# Patient Record
Sex: MALE | Race: OTHER | HISPANIC OR LATINO
[De-identification: names, ages, dates, MRNs, and addresses within clinical notes are randomized per-mention and may not be internally consistent; named-entity substitution may affect disease eponyms.]

---

## 2017-11-02 ENCOUNTER — APPOINTMENT (OUTPATIENT)
Dept: PULMONOLOGY | Facility: CLINIC | Age: 42
End: 2017-11-02
Payer: MEDICARE

## 2017-11-02 VITALS
WEIGHT: 298 LBS | BODY MASS INDEX: 40.36 KG/M2 | SYSTOLIC BLOOD PRESSURE: 115 MMHG | RESPIRATION RATE: 14 BRPM | OXYGEN SATURATION: 98 % | HEIGHT: 72 IN | HEART RATE: 79 BPM | DIASTOLIC BLOOD PRESSURE: 65 MMHG

## 2017-11-02 DIAGNOSIS — Z86.79 PERSONAL HISTORY OF OTHER DISEASES OF THE CIRCULATORY SYSTEM: ICD-10-CM

## 2017-11-02 DIAGNOSIS — Z78.9 OTHER SPECIFIED HEALTH STATUS: ICD-10-CM

## 2017-11-02 DIAGNOSIS — Z82.5 FAMILY HISTORY OF ASTHMA AND OTHER CHRONIC LOWER RESPIRATORY DISEASES: ICD-10-CM

## 2017-11-02 DIAGNOSIS — F17.200 NICOTINE DEPENDENCE, UNSPECIFIED, UNCOMPLICATED: ICD-10-CM

## 2017-11-02 DIAGNOSIS — R73.03 PREDIABETES.: ICD-10-CM

## 2017-11-02 DIAGNOSIS — Z86.19 PERSONAL HISTORY OF OTHER INFECTIOUS AND PARASITIC DISEASES: ICD-10-CM

## 2017-11-02 DIAGNOSIS — Z86.69 PERSONAL HISTORY OF OTHER DISEASES OF THE NERVOUS SYSTEM AND SENSE ORGANS: ICD-10-CM

## 2017-11-02 PROCEDURE — 99205 OFFICE O/P NEW HI 60 MIN: CPT | Mod: 25

## 2017-11-02 PROCEDURE — 94729 DIFFUSING CAPACITY: CPT

## 2017-11-02 PROCEDURE — 94620 PULMONARY STRESS TESTING SIMPLE: CPT

## 2017-11-02 PROCEDURE — 94010 BREATHING CAPACITY TEST: CPT | Mod: 59

## 2017-11-02 PROCEDURE — 71020: CPT

## 2017-11-02 RX ORDER — FUROSEMIDE 80 MG/1
TABLET ORAL
Refills: 0 | Status: ACTIVE | COMMUNITY

## 2017-11-02 RX ORDER — LOSARTAN POTASSIUM AND HYDROCHLOROTHIAZIDE 12.5; 1 MG/1; MG/1
100-12.5 TABLET ORAL
Refills: 0 | Status: ACTIVE | COMMUNITY

## 2017-11-02 RX ORDER — GABAPENTIN 300 MG
300 TABLET ORAL
Refills: 0 | Status: ACTIVE | COMMUNITY

## 2017-11-02 RX ORDER — CANAGLIFLOZIN 300 MG/1
TABLET, FILM COATED ORAL
Refills: 0 | Status: ACTIVE | COMMUNITY

## 2017-11-02 RX ORDER — DILTIAZEM HYDROCHLORIDE 120 MG/1
120 TABLET ORAL
Refills: 0 | Status: ACTIVE | COMMUNITY

## 2017-11-02 RX ORDER — FLUTICASONE PROPIONATE 50 MCG
SPRAY, SUSPENSION NASAL
Refills: 0 | Status: ACTIVE | COMMUNITY

## 2017-11-02 RX ORDER — MONTELUKAST SODIUM 10 MG/1
10 TABLET, FILM COATED ORAL
Refills: 0 | Status: ACTIVE | COMMUNITY

## 2017-11-02 RX ORDER — METFORMIN HYDROCHLORIDE 1000 MG/1
1000 TABLET, COATED ORAL
Refills: 0 | Status: ACTIVE | COMMUNITY

## 2017-11-02 RX ORDER — SITAGLIPTIN 100 MG/1
TABLET, FILM COATED ORAL
Refills: 0 | Status: ACTIVE | COMMUNITY

## 2017-11-02 RX ORDER — AZELASTINE HYDROCHLORIDE 137 UG/1
137 SPRAY, METERED NASAL
Refills: 0 | Status: ACTIVE | COMMUNITY

## 2017-12-14 ENCOUNTER — APPOINTMENT (OUTPATIENT)
Dept: PULMONOLOGY | Facility: CLINIC | Age: 42
End: 2017-12-14
Payer: MEDICARE

## 2017-12-14 VITALS
OXYGEN SATURATION: 97 % | BODY MASS INDEX: 41.99 KG/M2 | RESPIRATION RATE: 17 BRPM | DIASTOLIC BLOOD PRESSURE: 80 MMHG | WEIGHT: 310 LBS | SYSTOLIC BLOOD PRESSURE: 120 MMHG | HEIGHT: 72 IN | HEART RATE: 70 BPM

## 2017-12-14 PROCEDURE — 99214 OFFICE O/P EST MOD 30 MIN: CPT | Mod: 25

## 2017-12-14 PROCEDURE — 94010 BREATHING CAPACITY TEST: CPT

## 2017-12-14 RX ORDER — MONTELUKAST 10 MG/1
10 TABLET, FILM COATED ORAL
Qty: 1 | Refills: 3 | Status: ACTIVE | COMMUNITY
Start: 2017-12-14 | End: 1900-01-01

## 2018-02-23 ENCOUNTER — MEDICATION RENEWAL (OUTPATIENT)
Age: 43
End: 2018-02-23

## 2018-03-14 ENCOUNTER — APPOINTMENT (OUTPATIENT)
Dept: PULMONOLOGY | Facility: CLINIC | Age: 43
End: 2018-03-14

## 2018-04-09 ENCOUNTER — APPOINTMENT (OUTPATIENT)
Dept: PULMONOLOGY | Facility: CLINIC | Age: 43
End: 2018-04-09
Payer: MEDICARE

## 2018-04-09 VITALS
OXYGEN SATURATION: 97 % | RESPIRATION RATE: 17 BRPM | BODY MASS INDEX: 41.31 KG/M2 | DIASTOLIC BLOOD PRESSURE: 80 MMHG | WEIGHT: 305 LBS | HEIGHT: 72 IN | HEART RATE: 68 BPM | SYSTOLIC BLOOD PRESSURE: 140 MMHG

## 2018-04-09 PROCEDURE — 94010 BREATHING CAPACITY TEST: CPT

## 2018-04-09 PROCEDURE — 94729 DIFFUSING CAPACITY: CPT

## 2018-04-09 PROCEDURE — 99214 OFFICE O/P EST MOD 30 MIN: CPT | Mod: 25

## 2018-08-09 ENCOUNTER — NON-APPOINTMENT (OUTPATIENT)
Age: 43
End: 2018-08-09

## 2018-08-09 ENCOUNTER — APPOINTMENT (OUTPATIENT)
Dept: PULMONOLOGY | Facility: CLINIC | Age: 43
End: 2018-08-09
Payer: MEDICARE

## 2018-08-09 VITALS
RESPIRATION RATE: 18 BRPM | SYSTOLIC BLOOD PRESSURE: 120 MMHG | WEIGHT: 299 LBS | DIASTOLIC BLOOD PRESSURE: 76 MMHG | HEIGHT: 72 IN | BODY MASS INDEX: 40.5 KG/M2 | OXYGEN SATURATION: 97 % | HEART RATE: 66 BPM

## 2018-08-09 PROCEDURE — 99214 OFFICE O/P EST MOD 30 MIN: CPT | Mod: 25

## 2018-08-09 PROCEDURE — 94010 BREATHING CAPACITY TEST: CPT

## 2018-08-09 PROCEDURE — 99406 BEHAV CHNG SMOKING 3-10 MIN: CPT

## 2018-08-15 ENCOUNTER — MEDICATION RENEWAL (OUTPATIENT)
Age: 43
End: 2018-08-15

## 2018-09-25 ENCOUNTER — EMERGENCY (EMERGENCY)
Facility: HOSPITAL | Age: 43
LOS: 1 days | Discharge: HOME | End: 2018-09-25
Attending: EMERGENCY MEDICINE | Admitting: EMERGENCY MEDICINE
Payer: MEDICARE

## 2018-09-25 VITALS
SYSTOLIC BLOOD PRESSURE: 173 MMHG | HEART RATE: 73 BPM | RESPIRATION RATE: 18 BRPM | OXYGEN SATURATION: 97 % | DIASTOLIC BLOOD PRESSURE: 73 MMHG

## 2018-09-25 VITALS
DIASTOLIC BLOOD PRESSURE: 77 MMHG | RESPIRATION RATE: 18 BRPM | HEART RATE: 89 BPM | SYSTOLIC BLOOD PRESSURE: 147 MMHG | OXYGEN SATURATION: 97 % | TEMPERATURE: 98 F

## 2018-09-25 DIAGNOSIS — D17.20 BENIGN LIPOMATOUS NEOPLASM OF SKIN AND SUBCUTANEOUS TISSUE OF UNSPECIFIED LIMB: Chronic | ICD-10-CM

## 2018-09-25 DIAGNOSIS — K42.0 UMBILICAL HERNIA WITH OBSTRUCTION, WITHOUT GANGRENE: Chronic | ICD-10-CM

## 2018-09-25 LAB
ALBUMIN SERPL ELPH-MCNC: 4.3 G/DL — SIGNIFICANT CHANGE UP (ref 3.3–5)
ALP SERPL-CCNC: 82 U/L — SIGNIFICANT CHANGE UP (ref 40–120)
ALT FLD-CCNC: 17 U/L — SIGNIFICANT CHANGE UP (ref 4–41)
ANISOCYTOSIS BLD QL: SLIGHT — SIGNIFICANT CHANGE UP
APPEARANCE UR: SIGNIFICANT CHANGE UP
APTT BLD: 29.7 SEC — SIGNIFICANT CHANGE UP (ref 27.5–37.4)
AST SERPL-CCNC: 14 U/L — SIGNIFICANT CHANGE UP (ref 4–40)
BACTERIA # UR AUTO: HIGH
BASE EXCESS BLDV CALC-SCNC: 2.9 MMOL/L — SIGNIFICANT CHANGE UP
BASOPHILS # BLD AUTO: 0.07 K/UL — SIGNIFICANT CHANGE UP (ref 0–0.2)
BASOPHILS NFR BLD AUTO: 0.4 % — SIGNIFICANT CHANGE UP (ref 0–2)
BASOPHILS NFR SPEC: 0 % — SIGNIFICANT CHANGE UP (ref 0–2)
BILIRUB SERPL-MCNC: 0.6 MG/DL — SIGNIFICANT CHANGE UP (ref 0.2–1.2)
BILIRUB UR-MCNC: NEGATIVE — SIGNIFICANT CHANGE UP
BLASTS # FLD: 0 % — SIGNIFICANT CHANGE UP (ref 0–0)
BLD GP AB SCN SERPL QL: NEGATIVE — SIGNIFICANT CHANGE UP
BLOOD GAS VENOUS - CREATININE: 0.96 MG/DL — SIGNIFICANT CHANGE UP (ref 0.5–1.3)
BLOOD UR QL VISUAL: HIGH
BUN SERPL-MCNC: 21 MG/DL — SIGNIFICANT CHANGE UP (ref 7–23)
C TRACH RRNA SPEC QL NAA+PROBE: SIGNIFICANT CHANGE UP
CALCIUM SERPL-MCNC: 9.4 MG/DL — SIGNIFICANT CHANGE UP (ref 8.4–10.5)
CHLORIDE BLDV-SCNC: 106 MMOL/L — SIGNIFICANT CHANGE UP (ref 96–108)
CHLORIDE SERPL-SCNC: 100 MMOL/L — SIGNIFICANT CHANGE UP (ref 98–107)
CO2 SERPL-SCNC: 25 MMOL/L — SIGNIFICANT CHANGE UP (ref 22–31)
COLOR SPEC: YELLOW — SIGNIFICANT CHANGE UP
CREAT SERPL-MCNC: 1.07 MG/DL — SIGNIFICANT CHANGE UP (ref 0.5–1.3)
EOSINOPHIL # BLD AUTO: 0.1 K/UL — SIGNIFICANT CHANGE UP (ref 0–0.5)
EOSINOPHIL NFR BLD AUTO: 0.6 % — SIGNIFICANT CHANGE UP (ref 0–6)
EOSINOPHIL NFR FLD: 0.9 % — SIGNIFICANT CHANGE UP (ref 0–6)
GAS PNL BLDV: 137 MMOL/L — SIGNIFICANT CHANGE UP (ref 136–146)
GLUCOSE BLDV-MCNC: 96 — SIGNIFICANT CHANGE UP (ref 70–99)
GLUCOSE SERPL-MCNC: 95 MG/DL — SIGNIFICANT CHANGE UP (ref 70–99)
GLUCOSE UR-MCNC: >1000 — HIGH
HBA1C BLD-MCNC: 5.2 % — SIGNIFICANT CHANGE UP (ref 4–5.6)
HCO3 BLDV-SCNC: 26 MMOL/L — SIGNIFICANT CHANGE UP (ref 20–27)
HCT VFR BLD CALC: 48 % — SIGNIFICANT CHANGE UP (ref 39–50)
HCT VFR BLDV CALC: 51.5 % — HIGH (ref 39–51)
HGB BLD-MCNC: 16.3 G/DL — SIGNIFICANT CHANGE UP (ref 13–17)
HGB BLDV-MCNC: 16.8 G/DL — SIGNIFICANT CHANGE UP (ref 13–17)
HYALINE CASTS # UR AUTO: NEGATIVE — SIGNIFICANT CHANGE UP
IMM GRANULOCYTES # BLD AUTO: 0.15 # — SIGNIFICANT CHANGE UP
IMM GRANULOCYTES NFR BLD AUTO: 0.9 % — SIGNIFICANT CHANGE UP (ref 0–1.5)
INR BLD: 1.02 — SIGNIFICANT CHANGE UP (ref 0.88–1.17)
KETONES UR-MCNC: NEGATIVE — SIGNIFICANT CHANGE UP
LACTATE BLDV-MCNC: 1.3 MMOL/L — SIGNIFICANT CHANGE UP (ref 0.5–2)
LEUKOCYTE ESTERASE UR-ACNC: SIGNIFICANT CHANGE UP
LYMPHOCYTES # BLD AUTO: 16.5 % — SIGNIFICANT CHANGE UP (ref 13–44)
LYMPHOCYTES # BLD AUTO: 2.78 K/UL — SIGNIFICANT CHANGE UP (ref 1–3.3)
LYMPHOCYTES NFR SPEC AUTO: 11.6 % — LOW (ref 13–44)
MCHC RBC-ENTMCNC: 31.8 PG — SIGNIFICANT CHANGE UP (ref 27–34)
MCHC RBC-ENTMCNC: 34 % — SIGNIFICANT CHANGE UP (ref 32–36)
MCV RBC AUTO: 93.6 FL — SIGNIFICANT CHANGE UP (ref 80–100)
METAMYELOCYTES # FLD: 0 % — SIGNIFICANT CHANGE UP (ref 0–1)
MICROCYTES BLD QL: SLIGHT — SIGNIFICANT CHANGE UP
MONOCYTES # BLD AUTO: 1.85 K/UL — HIGH (ref 0–0.9)
MONOCYTES NFR BLD AUTO: 11 % — SIGNIFICANT CHANGE UP (ref 2–14)
MONOCYTES NFR BLD: 13.4 % — HIGH (ref 2–9)
MYELOCYTES NFR BLD: 0 % — SIGNIFICANT CHANGE UP (ref 0–0)
N GONORRHOEA RRNA SPEC QL NAA+PROBE: SIGNIFICANT CHANGE UP
NEUTROPHIL AB SER-ACNC: 72.3 % — SIGNIFICANT CHANGE UP (ref 43–77)
NEUTROPHILS # BLD AUTO: 11.85 K/UL — HIGH (ref 1.8–7.4)
NEUTROPHILS NFR BLD AUTO: 70.6 % — SIGNIFICANT CHANGE UP (ref 43–77)
NEUTS BAND # BLD: 0 % — SIGNIFICANT CHANGE UP (ref 0–6)
NITRITE UR-MCNC: POSITIVE — HIGH
NRBC # FLD: 0 — SIGNIFICANT CHANGE UP
OTHER - HEMATOLOGY %: 0 — SIGNIFICANT CHANGE UP
PCO2 BLDV: 47 MMHG — SIGNIFICANT CHANGE UP (ref 41–51)
PH BLDV: 7.39 PH — SIGNIFICANT CHANGE UP (ref 7.32–7.43)
PH UR: 6 — SIGNIFICANT CHANGE UP (ref 5–8)
PLATELET # BLD AUTO: 201 K/UL — SIGNIFICANT CHANGE UP (ref 150–400)
PLATELET COUNT - ESTIMATE: NORMAL — SIGNIFICANT CHANGE UP
PMV BLD: 10 FL — SIGNIFICANT CHANGE UP (ref 7–13)
PO2 BLDV: 46 MMHG — HIGH (ref 35–40)
POTASSIUM BLDV-SCNC: 3.7 MMOL/L — SIGNIFICANT CHANGE UP (ref 3.4–4.5)
POTASSIUM SERPL-MCNC: 3.9 MMOL/L — SIGNIFICANT CHANGE UP (ref 3.5–5.3)
POTASSIUM SERPL-SCNC: 3.9 MMOL/L — SIGNIFICANT CHANGE UP (ref 3.5–5.3)
PROMYELOCYTES # FLD: 0 % — SIGNIFICANT CHANGE UP (ref 0–0)
PROT SERPL-MCNC: 7.5 G/DL — SIGNIFICANT CHANGE UP (ref 6–8.3)
PROT UR-MCNC: 100 — HIGH
PROTHROM AB SERPL-ACNC: 11.7 SEC — SIGNIFICANT CHANGE UP (ref 9.8–13.1)
RBC # BLD: 5.13 M/UL — SIGNIFICANT CHANGE UP (ref 4.2–5.8)
RBC # FLD: 13.2 % — SIGNIFICANT CHANGE UP (ref 10.3–14.5)
RBC CASTS # UR COMP ASSIST: >50 — HIGH (ref 0–?)
REVIEW TO FOLLOW: YES — SIGNIFICANT CHANGE UP
RH IG SCN BLD-IMP: POSITIVE — SIGNIFICANT CHANGE UP
SAO2 % BLDV: 78.4 % — SIGNIFICANT CHANGE UP (ref 60–85)
SODIUM SERPL-SCNC: 139 MMOL/L — SIGNIFICANT CHANGE UP (ref 135–145)
SP GR SPEC: 1.02 — SIGNIFICANT CHANGE UP (ref 1–1.04)
SPECIMEN SOURCE: SIGNIFICANT CHANGE UP
SQUAMOUS # UR AUTO: SIGNIFICANT CHANGE UP
UROBILINOGEN FLD QL: NORMAL — SIGNIFICANT CHANGE UP
VARIANT LYMPHS # BLD: 1.8 % — SIGNIFICANT CHANGE UP
WBC # BLD: 16.8 K/UL — HIGH (ref 3.8–10.5)
WBC # FLD AUTO: 16.8 K/UL — HIGH (ref 3.8–10.5)
WBC UR QL: >50 — HIGH (ref 0–?)

## 2018-09-25 PROCEDURE — 99285 EMERGENCY DEPT VISIT HI MDM: CPT | Mod: GC

## 2018-09-25 PROCEDURE — 74177 CT ABD & PELVIS W/CONTRAST: CPT | Mod: 26

## 2018-09-25 RX ORDER — SODIUM CHLORIDE 9 MG/ML
1000 INJECTION INTRAMUSCULAR; INTRAVENOUS; SUBCUTANEOUS ONCE
Qty: 0 | Refills: 0 | Status: COMPLETED | OUTPATIENT
Start: 2018-09-25 | End: 2018-09-25

## 2018-09-25 RX ORDER — CEPHALEXIN 500 MG
1 CAPSULE ORAL
Qty: 20 | Refills: 0
Start: 2018-09-25 | End: 2018-10-04

## 2018-09-25 RX ORDER — PIPERACILLIN AND TAZOBACTAM 4; .5 G/20ML; G/20ML
3.38 INJECTION, POWDER, LYOPHILIZED, FOR SOLUTION INTRAVENOUS ONCE
Qty: 0 | Refills: 0 | Status: COMPLETED | OUTPATIENT
Start: 2018-09-25 | End: 2018-09-25

## 2018-09-25 RX ORDER — MORPHINE SULFATE 50 MG/1
4 CAPSULE, EXTENDED RELEASE ORAL ONCE
Qty: 0 | Refills: 0 | Status: DISCONTINUED | OUTPATIENT
Start: 2018-09-25 | End: 2018-09-25

## 2018-09-25 RX ADMIN — PIPERACILLIN AND TAZOBACTAM 3.38 GRAM(S): 4; .5 INJECTION, POWDER, LYOPHILIZED, FOR SOLUTION INTRAVENOUS at 12:31

## 2018-09-25 RX ADMIN — MORPHINE SULFATE 4 MILLIGRAM(S): 50 CAPSULE, EXTENDED RELEASE ORAL at 12:31

## 2018-09-25 RX ADMIN — PIPERACILLIN AND TAZOBACTAM 200 GRAM(S): 4; .5 INJECTION, POWDER, LYOPHILIZED, FOR SOLUTION INTRAVENOUS at 11:38

## 2018-09-25 RX ADMIN — SODIUM CHLORIDE 1000 MILLILITER(S): 9 INJECTION INTRAMUSCULAR; INTRAVENOUS; SUBCUTANEOUS at 11:31

## 2018-09-25 RX ADMIN — MORPHINE SULFATE 4 MILLIGRAM(S): 50 CAPSULE, EXTENDED RELEASE ORAL at 11:32

## 2018-09-25 NOTE — ED PROVIDER NOTE - NS ED ROS FT
GENERAL: +subjective fever and chills  EYES: no change in vision  HEENT: no trouble swallowing or speaking  CARDIAC: no chest pain  PULMONARY: no cough or SOB  GI: +RLQ abdominal pain. no nausea, no vomiting, no diarrhea or constipation  : +dysuria. no increased frequency or discharge.   SKIN: no rashes  NEURO: no headache or neuro sxs   MSK: No joint pain     ~Pranay Bentley PGY1

## 2018-09-25 NOTE — ED PROVIDER NOTE - ATTENDING CONTRIBUTION TO CARE
LIANA COOPER  ATTENDING, MD: 44 yo M with a past medical history of Hypertension and DM presents with RLQ abdominal pain x 4 days and dysuria and subjective fevers and chills. Fever has been controlled with IB 400mg. Patient denies HA, NP, chest pain, SOB, cough, N/V/D/C, weakness, dizziness, urinary frequency or hematuria, extremity pain or swelling or other complaints.     PHYSICAL EXAM:    GENERAL: Patient is awake and alert and in no acute distress.  Non-toxic appearing.  A+Ox4  HEAD:  Airway patent.  No oropharyngeal edema.  No stridor.  Auricles are normal.    EYES: EOM grossly intact, conjunctiva non-injected and sclera clear  NECK: Supple, No vertebral point tenderness to palpation.  CHEST/LUNG: Lungs clear to auscultation bilaterally; no wheeze, no rhonchi,  no rales.    HEART: Regular rate and rhythm;   ABDOMEN: Soft, RLQ tenderness to palpation.  No rebound/no guarding.  Bowel sounds present x 4.   MSK/EXTREMITIES: No clubbing or cyanosis. Back is nontender, with no vertebral point tenderness to palpation, no CVAT.  Moving all 4 extremities.     NEURO: Neurologically grossly intact.   No obvious deficits.   PSYCH: Psychiatrically normal mood and affect.  No apparent risk to self or others.       DR. COOPER, ATTENDING MD:    I performed a face to face bedside interview with patient regarding history of present illness, review of symptoms and past medical history. I completed an independent physical exam.  I have discussed patient's plan of care with the team of health care providers.   I agree with note as stated above, having amended the EMR as needed to reflect my findings. I have discussed the assessment and plan of care.  This includes during the time I functioned as the attending physician for this patient.

## 2018-09-25 NOTE — ED PROVIDER NOTE - PHYSICAL EXAMINATION
Gen: AAOx3, non-toxic  Head: NCAT  HEENT: EOMI, oral mucosa moist, normal conjunctiva  Lung: CTAB, no respiratory distress, no wheezes/rhonchi/rales B/L, speaking in full sentences  CV: RRR, no murmurs, rubs or gallops  Abd: soft, TTP over RLQ, no rebound tenderness, no guarding, no CVA tenderness. no abdominal or inguinal hernias palpated  : normal penile exam. no erythema, rash, or discharge  MSK: no visible deformities  Neuro: No focal sensory or motor deficits, normal CN exam   Skin: Warm, well perfused, no rash  Psych: normal affect.     ~Pranay Bentley PGY1

## 2018-09-25 NOTE — ED PROVIDER NOTE - PMH
DM (diabetes mellitus)  Type II  GERD (gastroesophageal reflux disease)    HTN (hypertension)    Morbid obesity    Neuropathy    Sleep apnea

## 2018-09-25 NOTE — ED PROVIDER NOTE - OBJECTIVE STATEMENT
43M hx of HTN and DM p/w RLQ abdominal pain for 4 days. The pain, which is sharp/moderate, started suddenly while playing video games. No inciting event or trauma. The patient has also had dysuria and subjective fevers/chills over the same time course. His fever/chills have stopped since starting 2 advil Q6 hours on Sunday. Denies any other symptoms including urinary frequency, discharge, hematuria, back pain, neck stiffness, headache, numbness, tingling, weakness, cough, SOB, or chest pain.

## 2018-09-25 NOTE — ED PROVIDER NOTE - MEDICAL DECISION MAKING DETAILS
43M hx HTN/DM p/w RLQ abdominal pain, dysuria, and fever/chills. Pt is well appearing. No CVA tenderness. No signs of hernia. No peritoneal signs. Normal  exam. DDx include UTI, prostatitis, STI, appendicitis, hernia. Will obtain UA and GC/chlamydia. Will determine need for further testing based on results.

## 2018-09-25 NOTE — ED ADULT TRIAGE NOTE - CHIEF COMPLAINT QUOTE
Pt c/o RLQ abd pain x 3 days. Denies N/V/D, c/o fever, states last night had fever" and abd pain worsening after urination. Denies hematuria, flank pain.  Hx: Umbilical hernia, HTN, DM

## 2018-11-09 ENCOUNTER — NON-APPOINTMENT (OUTPATIENT)
Age: 43
End: 2018-11-09

## 2018-11-09 ENCOUNTER — APPOINTMENT (OUTPATIENT)
Dept: PULMONOLOGY | Facility: CLINIC | Age: 43
End: 2018-11-09
Payer: MEDICARE

## 2018-11-09 VITALS
BODY MASS INDEX: 40.5 KG/M2 | SYSTOLIC BLOOD PRESSURE: 140 MMHG | RESPIRATION RATE: 17 BRPM | OXYGEN SATURATION: 98 % | DIASTOLIC BLOOD PRESSURE: 70 MMHG | HEIGHT: 72 IN | WEIGHT: 299 LBS | HEART RATE: 84 BPM

## 2018-11-09 PROCEDURE — 99214 OFFICE O/P EST MOD 30 MIN: CPT | Mod: 25

## 2018-11-09 PROCEDURE — 95012 NITRIC OXIDE EXP GAS DETER: CPT

## 2018-11-09 PROCEDURE — 94010 BREATHING CAPACITY TEST: CPT

## 2018-11-09 RX ORDER — NICOTINE 21 MG/24HR
14 PATCH, TRANSDERMAL 24 HOURS TRANSDERMAL DAILY
Qty: 30 | Refills: 3 | Status: ACTIVE | COMMUNITY
Start: 2018-11-09 | End: 1900-01-01

## 2018-11-09 NOTE — REASON FOR VISIT
[Follow-Up] : a follow-up visit [FreeTextEntry1] : asthma, allergic rhinitis, COPD, GERD, nicotine addiction, IMMANUEL, snoring, and shortness of breath

## 2018-11-09 NOTE — HISTORY OF PRESENT ILLNESS
[FreeTextEntry1] : Mr. Call is a 43 year old male presenting to the office for a follow up visit for asthma, allergic rhinitis, COPD, GERD, nicotine addiction, IMMANUEL, snoring, and shortness of breath. His chief complaint is difficulty breathing. \par -He notes his energy level has improved\par -He states he has been sleeping well with his CPAP\par -He reports he sleeps a full 8 hours on the machine and wake sup feeling rested\par -He reports he has occasional wheezing and chest tightness\par -He states he is still smoking, but has been cutting down\par -He reports he wants to try other methods to quit smoking\par -He states his bowels have been irregular and he has been constipated\par -He states he plans to take a laxative and will follow up with a GI if the issues do not resolve\par -He denies chest pain or pressure, dysphagia, coughing, hoarseness, heartburn, reflux

## 2018-11-09 NOTE — PROCEDURE
[FreeTextEntry1] : PFT-spi reveals normal flows with FEV1 of  4.18 ,which is   94% of predicted, normal flow volume loop\par \par FENO was  9   ; a normal value being less than 25\par \par Fractional exhaled nitric oxide (FENO) is regarded as a simple, noninvasive method for assessing eosinophilic airway inflammation. Produced by a variety of cells within the lung, nitric oxide (NO) concentrations are generally low in healthy individuals. However, high concentrations of NO appear to be involved in nonspecific host defense mechanisms and chronic inflammatory diseases such as asthma. The American Thoracic Society (ATS) therefore has recommended using FENO to aid in the diagnosis and monitoring of eosinophilic airway inflammation and asthma, and for identifying steroid responsive individuals whose chronic respiratory symptoms may be caused by airway inflammation.

## 2018-11-09 NOTE — PHYSICAL EXAM

## 2018-11-09 NOTE — ADDENDUM
[FreeTextEntry1] : Documented by Kenyetta Lujan acting as a scribe for Dr. Trey Walton on 11/9/2018.\par \par All medical record entries made by the Scribe were at my, Dr. Trey Walton's, direction and personally dictated by me on 11/9/2018. I have reviewed the chart and agree that the record accurately reflects my personal performance of the history, physical exam, assessment and plan. I have also personally directed, reviewed, and agree with the discharge instructions.

## 2018-11-09 NOTE — REVIEW OF SYSTEMS
[Negative] : Sleep Disorder [Chest Tightness] : chest tightness [As Noted in HPI] : as noted in HPI [Constipation] : constipation

## 2018-11-09 NOTE — ASSESSMENT
[FreeTextEntry1] : Mr. Call is a 43 year old male active smoker with a history of obesity, COPD/asthma, heart murmur, IMMANUEL, borderline DM, and HTN coming in for pulmonary re-evaluation for his shortness of breath as he is noncompliant. He is still trying to quit smoking. \par \par His shortness of breath is felt to be multifactorial due to:\par -overweight\par -out of shape\par -poor breathing mechanics\par -COPD (rarely active)\par -asthma\par -? underlying cardiac disease (valvular heart disease)\par -sinus congestion\par \par problem 1: COPD/asthma-non compliant\par -continue Singulair 10 mg QHS\par -continue  Tudorza at 1 inhalation QD\par -continue Breo 200 1 puff q day\par -continue Qvar 80 2 inhalations BID  \par -continue Proventil PRN and before exercise \par \par Compliance was highly stressed and discussed with the patient the importance of regular inhaler and medication use. \par \par -COPD is a progressive disease and although it can’t be cured , appropriate management can slow its progression, reduce frequency and severity of exacerbations, and improve symptoms and the patient quality of life. Hospitalizations are the greatest contributor to the total COPD costs and account for up to 87% of total COPD related costs. Exacerbations are the main cause of admissions and subsequently account for the 40-75% of COPD costs. Inhaled maintenance therapy reduces the incidence of exacerbations in patients with stable COPD. Incorrect inhaler use and nonadherence are major obstacles to achieving COPD treatment goals. Many COPD patients have challenges (impaired inhalation, limited dexterity, reduced cognition: that limit their ability to correctly use their COPD treatment devices resulting in reduced symptom control. Of most importance is smoking cessation and early intervention with respiratory illnesses and contemplation for pulmonary rehab to enhance quality of life.\par -Asthma is  believed to be caused by inherited (genetic) and environmental factor, but its exact cause is unknown. Asthma may be triggered by allergens, lung infections, or irritants in the air. Asthma triggers are different for each person\par -Inhaler technique reviewed as well as oral hygiene techniques reviewed with patient. Avoidance of cold air, extremes of temperature, rescue inhaler should be used before exercise. Order of medication reviewed with patient. Recommended use of a cool mist humidifier in the bedroom. \par \par problem 2: ? cardiac component\par -recommended cardiac evaluation secondary to history of hx of murmur, diabetes, HTN, smoking, and obesity \par \par problem 3: allergic rhinitis/sinusitis\par -recommended the Sinugator for nasal rinsing\par -followed by Flonase 1 sniff/nostril BID\par -followed by Astelin 0.15 1 sniff/nostril BID \par \par problem 4: current every day smoker\par -recommended to taper down cigarettes \par -recommended to try Nicotrol patches\par -Discussed for five minutes with the patient the risks/associations with continued smoking including COPD, emphysema, shortness of breath, renal cancer, bladder cancer, stroke risk, cardiac disease, etc. Smoking cessation was discussed at length and highly encouraged. Various options to aid cessation was discussed including use of Chantix, Nicotrol, nicotine products, laser therapy, hypnosis, Wellbutrin, etc. \par \par problem 5: IMMANUEL\par -continue to use the CPAP machine, benefiting, and tolerating it well\par -recommended to use humidifier and Mouth Kote\par -Sleep apnea is associated with adverse clinical consequences which an affect most organ systems.  Cardiovascular disease risk includes arrhythmias, atrial fibrillation, hypertension, coronary artery disease, and stroke. Metabolic disorders include diabetes type 2, non-alcoholic fatty liver disease. Mood disorder especially depression; and cognitive decline especially in the elderly. Associations with  chronic reflux/Luna’s esophagus some but not all inclusive. \par -Reasons to assess this include arousal consistent with hypopnea; respiratory events most prominent in REM sleep or supine position; therefore sleep staging and body position are important for accurate diagnosis and estimation of AHI.\par -According to the National Heart, Lung and Blood [place], CPAP or continuous positive airway pressure is a treatment that uses mild air pressure to keep breathing airways open. A CPAP machine includes a mask or other device that fits over the nose or nose and mouth. The mask is connected to a machine via the tube through which humidified air is blown. In the cases of obstructive sleep apnea, CPAP can reverse the complete blockages or narrowing of upper airways. Following diagnosis, CPAP machine pressures can be determined by a CPAP titration. Individuals who require CPAP can choose among masks and equipment that meet prescription and maximize comfort. Many become accustomed right away while others could require more time. Problems include uncomfortable masks or air leakage which can be adjusted to optimize compliance. \par \par problem 6: GERD\par -continue Omeprazole 40 mg before breakfast \par -Rule of 2s: avoid eating too much, eating too late, eating too spicy, eating two hours before bed\par -Things to avoid including overeating, spicy foods, tight clothing, eating within three hours of bed, this list is not all inclusive. \par -For treatment of reflux, possible options discussed including diet control, H2 blockers, PPIs, as well as coating motility agents discussed as treatment options. Timing of meals and proximity of last meal to sleep were discussed. If symptoms persist, a formal gastrointestinal evaluation is needed.\par \par problem 7: obesity \par -recommended regular exercise\par -mindful v mindless eating \par -Weight loss, exercise, and diet control were discussed and are highly encouraged. Treatment options were given such as, aqua therapy, and contacting a nutritionist. Recommended to use the elliptical, stationary bike, less use of treadmill.  Obesity is associated with worsening asthma, shortness of breath, and potential for cardiac disease, diabetes, and other underlying medical conditions.\par \par problem 8: poor breathing mechanics\par -Proper breathing techniques were reviewed with an emphasis of exhalation. Patient instructed to breath in for 1 second and out for four seconds. Patient was encouraged to not talk while walking. \par \par problem 9: r/o allergen profile- rediscussed \par -blood work to include: IgE level, eosinophil level, vitamin D level, food IgE level, and vitamin D level\par \par problem 10: r/o chronic fatigue\par -aside from OSAS other etiologies include thyroid disease, anemia, low testosterone levels, and vitamin deficiencies as well as medication effects. Based on this recommended: CBC, thyroid function test, vitamin D levels, sleep study, and free and total testosterone levels. Dr. Walton went over topic multiple times and discussed for an extensive period of time. \par \par problem 11: health maintenance \par -recommended to use Dr. Garcia's Intestinal Formula #1 \par -refused flu shot\par -recommended strep pneumonia vaccines: Prevnar-13 vaccine, followed by Pneumo vaccine 23 one year following\par -recommended early intervention for URIs\par -recommended regular osteoporosis evaluations\par -recommended early dermatological evaluations\par -recommended after the age of 50 to the age of 70, colonoscopy every 5 years \par \par Follow up in 4 months with spirometry and NiOx\par He is encouraged to call with any changes, concerns, or questions.

## 2019-03-07 ENCOUNTER — APPOINTMENT (OUTPATIENT)
Dept: PULMONOLOGY | Facility: CLINIC | Age: 44
End: 2019-03-07

## 2019-04-18 ENCOUNTER — RX RENEWAL (OUTPATIENT)
Age: 44
End: 2019-04-18

## 2019-05-09 ENCOUNTER — APPOINTMENT (OUTPATIENT)
Dept: PULMONOLOGY | Facility: CLINIC | Age: 44
End: 2019-05-09
Payer: MEDICARE

## 2019-05-09 ENCOUNTER — NON-APPOINTMENT (OUTPATIENT)
Age: 44
End: 2019-05-09

## 2019-05-09 VITALS
HEART RATE: 66 BPM | DIASTOLIC BLOOD PRESSURE: 70 MMHG | HEIGHT: 72 IN | WEIGHT: 293 LBS | BODY MASS INDEX: 39.68 KG/M2 | RESPIRATION RATE: 17 BRPM | OXYGEN SATURATION: 97 % | SYSTOLIC BLOOD PRESSURE: 130 MMHG

## 2019-05-09 PROCEDURE — 99406 BEHAV CHNG SMOKING 3-10 MIN: CPT

## 2019-05-09 PROCEDURE — 99214 OFFICE O/P EST MOD 30 MIN: CPT | Mod: 25

## 2019-05-09 PROCEDURE — 71046 X-RAY EXAM CHEST 2 VIEWS: CPT

## 2019-05-09 PROCEDURE — 94010 BREATHING CAPACITY TEST: CPT

## 2019-05-09 RX ORDER — MONTELUKAST 10 MG/1
10 TABLET, FILM COATED ORAL
Qty: 1 | Refills: 1 | Status: ACTIVE | COMMUNITY
Start: 2019-05-09 | End: 1900-01-01

## 2019-05-09 NOTE — PROCEDURE
[FreeTextEntry1] : PFT revealed mild restrictive dysfunction,  with a FEV1 of 3.17  L, which is 71 % of predicted, with a flattened inspiratory limb \par  \par CXR reveals a normal sized heart; no evidence of infiltrate or effusion--a normal appearing chest radiograph\par

## 2019-05-09 NOTE — HISTORY OF PRESENT ILLNESS
[FreeTextEntry1] : Mr. Call is a 43 year old male presenting to the office for a follow up visit for asthma, allergic rhinitis, COPD, GERD, nicotine addiction, IMMANUEL, snoring, and shortness of breath. His chief complaint is cough and congestion \par -he reports he was in [place] 3 months and he become sick with lung congestion and constant phlegm\par -he notes he then took Mucinex which made his symptoms worse\par -he states he currently has minor intermittent pain in his lower lungs/ribs when he breathes\par -he reports he is still brining up yellow or white sputum  \par -he notes he cannot take a deep breath nor laugh without coughing\par -he reports no cough while he lays down or on his back\par -he notes he sleeps well\par -he states he is still smoking 3-4 cigarettes a day but has been unable to completely stop\par -he reports constant sinus pressure and congestion in his rght side\par -he notes his weight is stable\par -he states he currently has an umbilical hernia\par -he notes he is doing his CPAP\par -he denies any chest pain, chest pressure, diarrhea, constipation, dysphagia, dizziness, sour taste in the mouth, heartburn, reflux

## 2019-05-09 NOTE — ADDENDUM
[FreeTextEntry1] : Documented by Rodrick Mancia acting as a scribe for Dr. Trey Walton on 05/09/2019 \par \par All medical record entries made by the Scribe were at my, Dr. Trey Walton's, direction and personally dictated by me on 05/09/2019  . I have reviewed the chart and agree that the record accurately reflects my personal performance of the history, physical exam, procedure, assessment and plan. I have also personally directed, reviewed, and agree with the discharge instructions. \par \par

## 2019-05-09 NOTE — PHYSICAL EXAM
[Normal Appearance] : normal appearance [General Appearance - Well Developed] : well developed [Well Groomed] : well groomed [General Appearance - Well Nourished] : well nourished [No Deformities] : no deformities [General Appearance - In No Acute Distress] : no acute distress [Normal Conjunctiva] : the conjunctiva exhibited no abnormalities [Eyelids - No Xanthelasma] : the eyelids demonstrated no xanthelasmas [Normal Oropharynx] : normal oropharynx [III] : III [Neck Appearance] : the appearance of the neck was normal [Neck Cervical Mass (___cm)] : no neck mass was observed [Jugular Venous Distention Increased] : there was no jugular-venous distention [Thyroid Nodule] : there were no palpable thyroid nodules [Thyroid Diffuse Enlargement] : the thyroid was not enlarged [Heart Rate And Rhythm] : heart rate and rhythm were normal [Heart Sounds] : normal S1 and S2 [Murmurs] : no murmurs present [Respiration, Rhythm And Depth] : normal respiratory rhythm and effort [Auscultation Breath Sounds / Voice Sounds] : lungs were clear to auscultation bilaterally [Exaggerated Use Of Accessory Muscles For Inspiration] : no accessory muscle use [Abdomen Soft] : soft [Abdomen Tenderness] : non-tender [Abdomen Mass (___ Cm)] : no abdominal mass palpated [Abnormal Walk] : normal gait [Gait - Sufficient For Exercise Testing] : the gait was sufficient for exercise testing [Nail Clubbing] : no clubbing of the fingernails [Cyanosis, Localized] : no localized cyanosis [Petechial Hemorrhages (___cm)] : no petechial hemorrhages [Skin Color & Pigmentation] : normal skin color and pigmentation [] : no rash [No Venous Stasis] : no venous stasis [Skin Lesions] : no skin lesions [No Skin Ulcers] : no skin ulcer [No Xanthoma] : no  xanthoma was observed [Deep Tendon Reflexes (DTR)] : deep tendon reflexes were 2+ and symmetric [Sensation] : the sensory exam was normal to light touch and pinprick [No Focal Deficits] : no focal deficits [Oriented To Time, Place, And Person] : oriented to person, place, and time [Impaired Insight] : insight and judgment were intact [Affect] : the affect was normal [FreeTextEntry1] : I:E 1:3, clear

## 2019-05-09 NOTE — REASON FOR VISIT
[Follow-Up] : a follow-up visit [Friend] : friend [FreeTextEntry1] : asthma, allergic rhinitis, COPD, GERD, nicotine addiction, IMMANUEL, snoring, and shortness of breath

## 2019-05-09 NOTE — ASSESSMENT
[FreeTextEntry1] : Mr. Call is a 43 year old male active smoker with a history of obesity, COPD/asthma, heart murmur, IMMANUEL, borderline DM, and HTN coming in for pulmonary re-evaluation for his shortness of breath as he is noncompliant. He is still trying to quit smoking. He is currently in the midst of an acte bronchitis\par \par His shortness of breath is felt to be multifactorial due to:\par -overweight\par -out of shape\par -poor breathing mechanics\par -COPD (rarely active)\par -asthma\par -? underlying cardiac disease (valvular heart disease)\par -sinus congestion\par \par problem 1A: acute bronchitis \par -add Biaxin 500 mg BID for 10 days \par \par problem 1: COPD/asthma-non compliant\par -add Prednisone taper, 20mg for 7 days, 10 mg for 7 days \par Information sheet given to the patient to be reviewed, this medication is never to be used without consulting the prescribing physician. Proper dietary restraint is necessary specifically salt containing foods, if any reaction may occur should be reported. \par \par -continue Singulair 10 mg QHS\par -continue  Tudorza at 1 inhalation QD\par -continue Breo 200 1 puff q day\par -continue Qvar 80 2 inhalations BID  \par -continue Proventil PRN and before exercise \par -add Albuterol nebulizer Q6H (gargle and spit after use) \par \par Compliance was highly stressed and discussed with the patient the importance of regular inhaler and medication use. \par \par -COPD is a progressive disease and although it can’t be cured , appropriate management can slow its progression, reduce frequency and severity of exacerbations, and improve symptoms and the patient quality of life. Hospitalizations are the greatest contributor to the total COPD costs and account for up to 87% of total COPD related costs. Exacerbations are the main cause of admissions and subsequently account for the 40-75% of COPD costs. Inhaled maintenance therapy reduces the incidence of exacerbations in patients with stable COPD. Incorrect inhaler use and nonadherence are major obstacles to achieving COPD treatment goals. Many COPD patients have challenges (impaired inhalation, limited dexterity, reduced cognition: that limit their ability to correctly use their COPD treatment devices resulting in reduced symptom control. Of most importance is smoking cessation and early intervention with respiratory illnesses and contemplation for pulmonary rehab to enhance quality of life.\par -Asthma is  believed to be caused by inherited (genetic) and environmental factor, but its exact cause is unknown. Asthma may be triggered by allergens, lung infections, or irritants in the air. Asthma triggers are different for each person\par -Inhaler technique reviewed as well as oral hygiene techniques reviewed with patient. Avoidance of cold air, extremes of temperature, rescue inhaler should be used before exercise. Order of medication reviewed with patient. Recommended use of a cool mist humidifier in the bedroom. \par \par problem 2: ? cardiac component\par -recommended cardiac evaluation secondary to history of hx of murmur, diabetes, HTN, smoking, and obesity \par \par problem 3: allergic rhinitis/sinusitis\par -recommended the Sinugator for nasal rinsing\par -followed by Flonase 1 sniff/nostril BID\par -followed by Astelin 0.15 1 sniff/nostril BID \par \par problem 4: current every day smoker\par -recommended to taper down cigarettes \par -recommended to try Nicotrol patches\par -Discussed for five minutes with the patient the risks/associations with continued smoking including COPD, emphysema, shortness of breath, renal cancer, bladder cancer, stroke risk, cardiac disease, etc. Smoking cessation was discussed at length and highly encouraged. Various options to aid cessation was discussed including use of Chantix, Nicotrol, nicotine products, laser therapy, hypnosis, Wellbutrin, etc. \par \par problem 5: IMMANUEL\par -continue to use the CPAP machine, benefiting, and tolerating it well\par -recommended to use humidifier and Mouth Kote\par -Sleep apnea is associated with adverse clinical consequences which an affect most organ systems.  Cardiovascular disease risk includes arrhythmias, atrial fibrillation, hypertension, coronary artery disease, and stroke. Metabolic disorders include diabetes type 2, non-alcoholic fatty liver disease. Mood disorder especially depression; and cognitive decline especially in the elderly. Associations with  chronic reflux/Luna’s esophagus some but not all inclusive. \par -Reasons to assess this include arousal consistent with hypopnea; respiratory events most prominent in REM sleep or supine position; therefore sleep staging and body position are important for accurate diagnosis and estimation of AHI.\par -According to the [employer], CPAP or continuous positive airway pressure is a treatment that uses mild air pressure to keep breathing airways open. A CPAP machine includes a mask or other device that fits over the nose or nose and mouth. The mask is connected to a machine via the tube through which humidified air is blown. In the cases of obstructive sleep apnea, CPAP can reverse the complete blockages or narrowing of upper airways. Following diagnosis, CPAP machine pressures can be determined by a CPAP titration. Individuals who require CPAP can choose among masks and equipment that meet prescription and maximize comfort. Many become accustomed right away while others could require more time. Problems include uncomfortable masks or air leakage which can be adjusted to optimize compliance. \par \par problem 6: GERD\par -continue Omeprazole 40 mg before breakfast \par -Rule of 2s: avoid eating too much, eating too late, eating too spicy, eating two hours before bed\par -Things to avoid including overeating, spicy foods, tight clothing, eating within three hours of bed, this list is not all inclusive. \par -For treatment of reflux, possible options discussed including diet control, H2 blockers, PPIs, as well as coating motility agents discussed as treatment options. Timing of meals and proximity of last meal to sleep were discussed. If symptoms persist, a formal gastrointestinal evaluation is needed.\par \par problem 7: obesity \par -recommended regular exercise\par -mindful v mindless eating \par -Weight loss, exercise, and diet control were discussed and are highly encouraged. Treatment options were given such as, aqua therapy, and contacting a nutritionist. Recommended to use the elliptical, stationary bike, less use of treadmill.  Obesity is associated with worsening asthma, shortness of breath, and potential for cardiac disease, diabetes, and other underlying medical conditions.\par \par problem 8: poor breathing mechanics\par -Proper breathing techniques were reviewed with an emphasis of exhalation. Patient instructed to breath in for 1 second and out for four seconds. Patient was encouraged to not talk while walking. \par \par problem 9: r/o allergen profile- rediscussed \par -blood work to include: IgE level, eosinophil level, vitamin D level, food IgE level, and vitamin D level (prescription re-given)\par \par problem 10: r/o chronic fatigue\par -aside from OSAS other etiologies include thyroid disease, anemia, low testosterone levels, and vitamin deficiencies as well as medication effects. Based on this recommended: CBC, thyroid function test, vitamin D levels, sleep study, and free and total testosterone levels. Dr. Walton went over topic multiple times and discussed for an extensive period of time. \par \par problem 11: health maintenance \par -recommended to use Dr. Garcia's Intestinal Formula #1 \par -refused flu shot\par -recommended strep pneumonia vaccines: Prevnar-13 vaccine, followed by Pneumo vaccine 23 one year following\par -recommended early intervention for URIs\par -recommended regular osteoporosis evaluations\par -recommended early dermatological evaluations\par -recommended after the age of 50 to the age of 70, colonoscopy every 5 years \par \par Follow up in 4 months with spirometry and NiOx\par He is encouraged to call with any changes, concerns, or questions.

## 2019-06-10 ENCOUNTER — RX RENEWAL (OUTPATIENT)
Age: 44
End: 2019-06-10

## 2019-07-06 ENCOUNTER — RX RENEWAL (OUTPATIENT)
Age: 44
End: 2019-07-06

## 2019-08-09 ENCOUNTER — APPOINTMENT (OUTPATIENT)
Dept: PULMONOLOGY | Facility: CLINIC | Age: 44
End: 2019-08-09

## 2019-09-09 ENCOUNTER — NON-APPOINTMENT (OUTPATIENT)
Age: 44
End: 2019-09-09

## 2019-09-09 ENCOUNTER — APPOINTMENT (OUTPATIENT)
Dept: PULMONOLOGY | Facility: CLINIC | Age: 44
End: 2019-09-09
Payer: MEDICARE

## 2019-09-09 VITALS
SYSTOLIC BLOOD PRESSURE: 110 MMHG | WEIGHT: 283 LBS | OXYGEN SATURATION: 98 % | HEIGHT: 72 IN | DIASTOLIC BLOOD PRESSURE: 80 MMHG | HEART RATE: 68 BPM | BODY MASS INDEX: 38.33 KG/M2 | RESPIRATION RATE: 17 BRPM

## 2019-09-09 PROCEDURE — 99406 BEHAV CHNG SMOKING 3-10 MIN: CPT

## 2019-09-09 PROCEDURE — 94010 BREATHING CAPACITY TEST: CPT

## 2019-09-09 PROCEDURE — 99214 OFFICE O/P EST MOD 30 MIN: CPT | Mod: 25

## 2019-09-09 RX ORDER — FUROSEMIDE 40 MG/1
40 TABLET ORAL
Qty: 90 | Refills: 0 | Status: ACTIVE | COMMUNITY
Start: 2019-05-22

## 2019-09-09 RX ORDER — CICLOPIROX 7.7 MG/G
0.77 GEL TOPICAL
Qty: 180 | Refills: 0 | Status: ACTIVE | COMMUNITY
Start: 2019-06-16

## 2019-09-09 RX ORDER — METHOCARBAMOL 500 MG/1
500 TABLET, FILM COATED ORAL
Qty: 240 | Refills: 0 | Status: ACTIVE | COMMUNITY
Start: 2019-07-26

## 2019-09-09 RX ORDER — GABAPENTIN 300 MG/1
300 CAPSULE ORAL
Qty: 270 | Refills: 0 | Status: ACTIVE | COMMUNITY
Start: 2019-05-22

## 2019-09-09 RX ORDER — DAPAGLIFLOZIN AND METFORMIN HYDROCHLORIDE 10; 1000 MG/1; MG/1
10-1000 TABLET, FILM COATED, EXTENDED RELEASE ORAL
Qty: 90 | Refills: 0 | Status: ACTIVE | COMMUNITY
Start: 2019-06-24

## 2019-09-09 RX ORDER — CARVEDILOL 12.5 MG/1
12.5 TABLET, FILM COATED ORAL
Qty: 180 | Refills: 0 | Status: ACTIVE | COMMUNITY
Start: 2019-07-20

## 2019-09-09 RX ORDER — CLOTRIMAZOLE 10 MG/ML
1 SOLUTION TOPICAL
Qty: 30 | Refills: 0 | Status: ACTIVE | COMMUNITY
Start: 2019-06-16

## 2019-09-09 RX ORDER — GABAPENTIN 600 MG/1
600 TABLET, COATED ORAL
Qty: 270 | Refills: 0 | Status: ACTIVE | COMMUNITY
Start: 2019-07-20

## 2019-09-09 RX ORDER — TELMISARTAN AND HYDROCHLOROTHIAZIDE 80; 12.5 MG/1; MG/1
80-12.5 TABLET ORAL
Qty: 90 | Refills: 0 | Status: ACTIVE | COMMUNITY
Start: 2019-06-24

## 2019-09-09 RX ORDER — MELOXICAM 7.5 MG/1
7.5 TABLET ORAL
Qty: 90 | Refills: 0 | Status: ACTIVE | COMMUNITY
Start: 2019-05-22

## 2019-09-09 RX ORDER — FLUOCINONIDE 0.5 MG/ML
0.05 SOLUTION TOPICAL
Qty: 360 | Refills: 0 | Status: ACTIVE | COMMUNITY
Start: 2019-06-11

## 2019-09-09 RX ORDER — BUDESONIDE AND FORMOTEROL FUMARATE DIHYDRATE 160; 4.5 UG/1; UG/1
160-4.5 AEROSOL RESPIRATORY (INHALATION)
Qty: 31 | Refills: 0 | Status: ACTIVE | COMMUNITY
Start: 2019-06-24

## 2019-09-09 NOTE — ADDENDUM
[FreeTextEntry1] : Documented by Leoncio Charles acting as a scribe for Dr. Trey Walton on 09/09/2019.\par \par All medical record entries made by the Scribe were at my, Dr. Trey Walton's, direction and personally dictated by me on 09/09/2019. I have reviewed the chart and agree that the record accurately reflects my personal performance of the history, physical exam, assessment and plan. I have also personally directed, reviewed, and agree with the discharge instructions.

## 2019-09-09 NOTE — PHYSICAL EXAM

## 2019-09-09 NOTE — HISTORY OF PRESENT ILLNESS
[FreeTextEntry1] : Mr. Call is a 43 year old male presenting to the office for a follow up visit for asthma, allergic rhinitis, COPD, GERD, nicotine addiction, IMMANUEL, snoring, and shortness of breath. His chief complaint is his cough.\par -he reports feeling well\par -he has reduced his smoking \par -he notes he he has been coughing more as he has been weaning down on his smoking\par -he notes he coughs up yellow sputum.\par -he reports having some chest pain in his sides\par -he notes having pain from an abdominal hernia\par -he notes his breathing is mostly well, except when he lays down to go to sleep, and has palpitations for a few minutes\par -he reports using his CPAP nightly\par -he notes his nose and sinuses are constantly congested, with congestion switching between his nostrils, and none of his nasal sprays are working\par -he reports his SOB has reduced slightly\par -he notes he has been using his respiratory medications, except for his nebulizer\par -he notes he has borderline DM\par -he denies any chest pressure, diarrhea, constipation, dysphagia, dizziness, sour taste in the mouth, leg swelling, heartburn, reflux

## 2019-09-09 NOTE — PROCEDURE
[FreeTextEntry1] : PFT revealed normal flows, with a FEV1 of 3.84L, which is 87% of predicted, with a normal flow volume loop

## 2019-09-09 NOTE — ASSESSMENT
[FreeTextEntry1] : Mr. Call is a 44 year old male active smoker with a history of obesity, COPD/asthma, heart murmur, IMMANUEL, borderline DM, and HTN coming in for pulmonary re-evaluation for his shortness of breath as he is noncompliant. He is still trying to quit smoking. He is currently less SOB but has a mild cough.\par \par His shortness of breath is felt to be multifactorial due to:\par -overweight\par -out of shape\par -poor breathing mechanics\par -COPD (rarely active)\par -asthma\par -? underlying cardiac disease (valvular heart disease)\par -sinus congestion\par \par problem 1: COPD/asthma-non compliant\par -continue Singulair 10 mg QHS\par -continue  Tudorza at 1 inhalation QD\par -continue Breo 200 1 puff q day\par -continue Qvar 80 2 inhalations BID  \par -continue Proventil PRN and before exercise \par -continue Albuterol nebulizer Q6H (gargle and spit after use) \par \par Compliance was highly stressed and discussed with the patient the importance of regular inhaler and medication use. \par \par -COPD is a progressive disease and although it can’t be cured , appropriate management can slow its progression, reduce frequency and severity of exacerbations, and improve symptoms and the patient quality of life. Hospitalizations are the greatest contributor to the total COPD costs and account for up to 87% of total COPD related costs. Exacerbations are the main cause of admissions and subsequently account for the 40-75% of COPD costs. Inhaled maintenance therapy reduces the incidence of exacerbations in patients with stable COPD. Incorrect inhaler use and nonadherence are major obstacles to achieving COPD treatment goals. Many COPD patients have challenges (impaired inhalation, limited dexterity, reduced cognition: that limit their ability to correctly use their COPD treatment devices resulting in reduced symptom control. Of most importance is smoking cessation and early intervention with respiratory illnesses and contemplation for pulmonary rehab to enhance quality of life.\par -Asthma is  believed to be caused by inherited (genetic) and environmental factor, but its exact cause is unknown. Asthma may be triggered by allergens, lung infections, or irritants in the air. Asthma triggers are different for each person\par -Inhaler technique reviewed as well as oral hygiene techniques reviewed with patient. Avoidance of cold air, extremes of temperature, rescue inhaler should be used before exercise. Order of medication reviewed with patient. Recommended use of a cool mist humidifier in the bedroom. \par \par problem 2: ? cardiac component\par -recommended cardiac evaluation secondary to history of hx of murmur, diabetes, HTN, smoking, and obesity \par \par problem 3: allergic rhinitis/sinusitis\par -recommended the Sinugator for nasal rinsing\par -followed by Flonase 1 sniff/nostril BID - transition to Xhance 1 sniff BID\par -followed by Astelin 0.15 1 sniff/nostril BID \par \par problem 4: current every day smoker / nicotine addiction\par -recommended to taper down cigarettes \par -recommended to try Nicotrol patches\par -Discussed for five minutes with the patient the risks/associations with continued smoking including COPD, emphysema, shortness of breath, renal cancer, bladder cancer, stroke risk, cardiac disease, etc. Smoking cessation was discussed at length and highly encouraged. Various options to aid cessation was discussed including use of Chantix, Nicotrol, nicotine products, laser therapy, hypnosis, Wellbutrin, etc. \par \par problem 5: IMMANUEL\par -continue to use the CPAP machine, benefiting, and tolerating it well\par -recommended to use humidifier and Mouth Kote\par -Sleep apnea is associated with adverse clinical consequences which an affect most organ systems.  Cardiovascular disease risk includes arrhythmias, atrial fibrillation, hypertension, coronary artery disease, and stroke. Metabolic disorders include diabetes type 2, non-alcoholic fatty liver disease. Mood disorder especially depression; and cognitive decline especially in the elderly. Associations with  chronic reflux/Luna’s esophagus some but not all inclusive. \par -Reasons to assess this include arousal consistent with hypopnea; respiratory events most prominent in REM sleep or supine position; therefore sleep staging and body position are important for accurate diagnosis and estimation of AHI.\par -According to the National [employer] [place], CPAP or continuous positive airway pressure is a treatment that uses mild air pressure to keep breathing airways open. A CPAP machine includes a mask or other device that fits over the nose or nose and mouth. The mask is connected to a machine via the tube through which humidified air is blown. In the cases of obstructive sleep apnea, CPAP can reverse the complete blockages or narrowing of upper airways. Following diagnosis, CPAP machine pressures can be determined by a CPAP titration. Individuals who require CPAP can choose among masks and equipment that meet prescription and maximize comfort. Many become accustomed right away while others could require more time. Problems include uncomfortable masks or air leakage which can be adjusted to optimize compliance. \par \par problem 6: GERD\par -continue Omeprazole 40 mg before breakfast \par -Rule of 2s: avoid eating too much, eating too late, eating too spicy, eating two hours before bed\par -Things to avoid including overeating, spicy foods, tight clothing, eating within three hours of bed, this list is not all inclusive. \par -For treatment of reflux, possible options discussed including diet control, H2 blockers, PPIs, as well as coating motility agents discussed as treatment options. Timing of meals and proximity of last meal to sleep were discussed. If symptoms persist, a formal gastrointestinal evaluation is needed.\par \par problem 7: obesity \par -recommended regular exercise\par -mindful v mindless eating \par -Weight loss, exercise, and diet control were discussed and are highly encouraged. Treatment options were given such as, aqua therapy, and contacting a nutritionist. Recommended to use the elliptical, stationary bike, less use of treadmill.  Obesity is associated with worsening asthma, shortness of breath, and potential for cardiac disease, diabetes, and other underlying medical conditions.\par \par problem 8: poor breathing mechanics\par -Proper breathing techniques were reviewed with an emphasis of exhalation. Patient instructed to breath in for 1 second and out for four seconds. Patient was encouraged to not talk while walking. \par \par problem 9: r/o allergen profile- rediscussed \par -blood work to include: IgE level, eosinophil level, vitamin D level, food IgE level, and vitamin D level (prescription re-given)\par \par problem 10: r/o chronic fatigue\par -aside from OSAS other etiologies include thyroid disease, anemia, low testosterone levels, and vitamin deficiencies as well as medication effects. Based on this recommended: CBC, thyroid function test, vitamin D levels, sleep study, and free and total testosterone levels. Dr. Walton went over topic multiple times and discussed for an extensive period of time. \par \par problem 11: health maintenance \par -recommended to use Dr. Garcia's Intestinal Formula #1 \par -refused flu shot\par -recommended strep pneumonia vaccines: Prevnar-13 vaccine, followed by Pneumo vaccine 23 one year following\par -recommended early intervention for URIs\par -recommended regular osteoporosis evaluations\par -recommended early dermatological evaluations\par -recommended after the age of 50 to the age of 70, colonoscopy every 5 years \par \par Follow up in 4 months with spirometry and NiOx\par He is encouraged to call with any changes, concerns, or questions.

## 2019-09-09 NOTE — REVIEW OF SYSTEMS
[Negative] : Sleep Disorder [Cough] : cough [Sputum] : sputum  [Dyspnea] : dyspnea [Wheezing] : wheezing [Chest Discomfort] : chest discomfort [Palpitations] : palpitations [As Noted in HPI] : as noted in HPI [Heartburn] : no heartburn [Reflux] : no reflux [Dysphagia] : no dysphagia [Constipation] : no constipation [Diarrhea] : no diarrhea

## 2019-09-22 ENCOUNTER — RX RENEWAL (OUTPATIENT)
Age: 44
End: 2019-09-22

## 2019-10-18 ENCOUNTER — APPOINTMENT (OUTPATIENT)
Dept: SURGERY | Facility: CLINIC | Age: 44
End: 2019-10-18

## 2019-11-23 ENCOUNTER — RX RENEWAL (OUTPATIENT)
Age: 44
End: 2019-11-23

## 2020-01-06 ENCOUNTER — NON-APPOINTMENT (OUTPATIENT)
Age: 45
End: 2020-01-06

## 2020-01-06 ENCOUNTER — APPOINTMENT (OUTPATIENT)
Dept: PULMONOLOGY | Facility: CLINIC | Age: 45
End: 2020-01-06
Payer: MEDICARE

## 2020-01-06 VITALS
WEIGHT: 290 LBS | DIASTOLIC BLOOD PRESSURE: 75 MMHG | BODY MASS INDEX: 40.6 KG/M2 | SYSTOLIC BLOOD PRESSURE: 120 MMHG | HEART RATE: 71 BPM | RESPIRATION RATE: 17 BRPM | HEIGHT: 71 IN | OXYGEN SATURATION: 97 %

## 2020-01-06 PROCEDURE — 94010 BREATHING CAPACITY TEST: CPT

## 2020-01-06 PROCEDURE — 71046 X-RAY EXAM CHEST 2 VIEWS: CPT

## 2020-01-06 PROCEDURE — 95012 NITRIC OXIDE EXP GAS DETER: CPT

## 2020-01-06 PROCEDURE — 99214 OFFICE O/P EST MOD 30 MIN: CPT | Mod: 25

## 2020-01-06 RX ORDER — ATORVASTATIN CALCIUM 20 MG/1
20 TABLET, FILM COATED ORAL
Qty: 90 | Refills: 0 | Status: DISCONTINUED | COMMUNITY
Start: 2019-06-24 | End: 2020-01-06

## 2020-01-06 NOTE — PHYSICAL EXAM

## 2020-01-06 NOTE — HISTORY OF PRESENT ILLNESS
[FreeTextEntry1] : Mr. Call is a 44 year old male presenting to the office for a follow up visit for asthma, allergic rhinitis, COPD, GERD, nicotine addiction, IMMANUEL, snoring, and shortness of breath. His chief complaint is chest pains\par he reports having chest pains around his lower ribs, and is s/p a MRI, and was told he has a lower lung infection\par -he reports having upper chest tightness and achiness\par -he reports having side pains and lower chest pains\par -he reports having slight heartburn and reflux at night\par -he notes his sputum is usually clear, but is occasionally yellow \par -he reports having sinus and nasal congestion, and the nasal sprays haven't worked\par -he notes he smokes 3 cigarettes daily\par -he reports he takes his asthma medications regularly; and Ventolin and nebulizer PRN\par -he denies any visual issues, fever, chills, sweats, diarrhea, constipation, dysphagia, dizziness, sour taste in the mouth

## 2020-01-06 NOTE — ADDENDUM
[FreeTextEntry1] : Documented by Malcolm Charles acting as a scribe for Dr. Trey Walton on 01/06/2020.\par \par All medical record entries made by the Scribe were at my, Dr. Trey Walton's, direction and personally dictated by me on 01/06/2020. I have reviewed the chart and agree that the record accurately reflects my personal performance of the history, physical exam, assessment and plan. I have also personally directed, reviewed, and agree with the discharge instructions.

## 2020-01-06 NOTE — PROCEDURE
[FreeTextEntry1] : CXR reveals a normal size heart and no evidence of infiltrate or effusion\par \par PFT revealed mild obstructive dysfunction, with a FEV1 of 3.33 L, which is 78% of predicted, with a normal flow volume loop\par \par FENO was 8; a normal value being less than 25\par Fractional exhaled nitric oxide (FENO) is regarded as a simple, noninvasive method for assessing eosinophilic airway inflammation. Produced by a variety of cells within the lung, nitric oxide (NO) concentrations are generally low in healthy individuals. However, high concentrations of NO appear to be involved in nonspecific host defense mechanisms and chronic inflammatory diseases such as asthma. The American Thor[employer] Society (ATS) therefore has recommended using FENO to aid in the diagnosis and monitoring of eosinophilic airway inflammation and asthma, and for identifying steroid responsive individuals whose chronic respiratory symptoms may be caused by airway inflammation.

## 2020-01-06 NOTE — REVIEW OF SYSTEMS
[Negative] : Sleep Disorder [Sputum] : sputum  [Chest Tightness] : chest tightness [Chest Discomfort] : chest discomfort [Heartburn] : heartburn [Reflux] : reflux [As Noted in HPI] : as noted in HPI [Fever] : no fever [Chills] : no chills [Dysphagia] : no dysphagia [Constipation] : no constipation [Diarrhea] : no diarrhea [Difficulty Initiating Sleep] : no difficulty falling asleep [Dizziness] : no dizziness [Difficulty Maintaining Sleep] : no difficulty maintaining sleep

## 2020-01-06 NOTE — ASSESSMENT
[FreeTextEntry1] : Mr. Call is a 44 year old male active smoker with a history of obesity, COPD/asthma, heart murmur, IMMANUEL, borderline DM, and HTN coming in for pulmonary re-evaluation for his shortness of breath as he is noncompliant. He is still trying to quit smoking. He is currently in the midst of acute bronchitis / asthmatic bronchitis.\par \par His shortness of breath is felt to be multifactorial due to:\par -overweight\par -out of shape\par -poor breathing mechanics\par -COPD (rarely active)\par -asthma\par -? underlying cardiac disease (valvular heart disease)\par -sinus congestion\par \par Problem 1A: Acute bronchitis\par -Add Zithromax 500 mg QD for 7 days\par \par You have a clinical scenario most c/q acute bronchitis the etiology of which is unknown but empiric antibiotics are indicated. Hydration, mucolytics including mucinex, robitussin and the like are indicated. Cough controlling agents will be needed. \par \par problem 1: COPD/asthma - active\par -Add a short course of Prednisone; 20 mg for 7 days, then 10 mg for 7 days\par Information sheet given to the patient to be reviewed, this medication is never to be used without consulting the prescribing physician. Proper dietary restraint is necessary specifically salt containing foods, if any reaction may occur should be reported. \par \par -continue Singulair 10 mg QHS\par -continue  Tudorza at 1 inhalation QD\par -continue Breo 200 1 puff q day\par -continue Qvar 80 2 inhalations BID  \par -continue Proventil PRN and before exercise \par -continue Albuterol nebulizer Q6H (gargle and spit after use) \par \par Compliance was highly stressed and discussed with the patient the importance of regular inhaler and medication use. \par \par -COPD is a progressive disease and although it can’t be cured , appropriate management can slow its progression, reduce frequency and severity of exacerbations, and improve symptoms and the patient quality of life. Hospitalizations are the greatest contributor to the total COPD costs and account for up to 87% of total COPD related costs. Exacerbations are the main cause of admissions and subsequently account for the 40-75% of COPD costs. Inhaled maintenance therapy reduces the incidence of exacerbations in patients with stable COPD. Incorrect inhaler use and nonadherence are major obstacles to achieving COPD treatment goals. Many COPD patients have challenges (impaired inhalation, limited dexterity, reduced cognition: that limit their ability to correctly use their COPD treatment devices resulting in reduced symptom control. Of most importance is smoking cessation and early intervention with respiratory illnesses and contemplation for pulmonary rehab to enhance quality of life.\par -Asthma is  believed to be caused by inherited (genetic) and environmental factor, but its exact cause is unknown. Asthma may be triggered by allergens, lung infections, or irritants in the air. Asthma triggers are different for each person\par -Inhaler technique reviewed as well as oral hygiene techniques reviewed with patient. Avoidance of cold air, extremes of temperature, rescue inhaler should be used before exercise. Order of medication reviewed with patient. Recommended use of a cool mist humidifier in the bedroom. \par \par problem 2: ? cardiac component\par -recommended cardiac evaluation secondary to history of hx of murmur, diabetes, HTN, smoking, and obesity \par \par problem 3: allergic rhinitis/sinusitis\par -recommended the Sinugator for nasal rinsing\par -followed by Flonase 1 sniff/nostril BID - transition to Xhance 1 sniff BID\par -followed by Astelin 0.15 1 sniff/nostril BID \par \par problem 4: current every day smoker / nicotine addiction\par -recommended to taper down cigarettes \par -recommended to try Nicotrol patches\par -Discussed for five minutes with the patient the risks/associations with continued smoking including COPD, emphysema, shortness of breath, renal cancer, bladder cancer, stroke risk, cardiac disease, etc. Smoking cessation was discussed at length and highly encouraged. Various options to aid cessation was discussed including use of Chantix, Nicotrol, nicotine products, laser therapy, hypnosis, Wellbutrin, etc. \par \par problem 5: IMMANUEL\par -continue to use the CPAP machine, benefiting, and tolerating it well\par -recommended to use humidifier and Mouth Kote\par -Sleep apnea is associated with adverse clinical consequences which an affect most organ systems.  Cardiovascular disease risk includes arrhythmias, atrial fibrillation, hypertension, coronary artery disease, and stroke. Metabolic disorders include diabetes type 2, non-alcoholic fatty liver disease. Mood disorder especially depression; and cognitive decline especially in the elderly. Associations with  chronic reflux/Luna’s esophagus some but not all inclusive. \par -Reasons to assess this include arousal consistent with hypopnea; respiratory events most prominent in REM sleep or supine position; therefore sleep staging and body position are important for accurate diagnosis and estimation of AHI.\par -According to the National Heart, Lung and Blood [place], CPAP or continuous positive airway pressure is a treatment that uses mild air pressure to keep breathing airways open. A CPAP machine includes a mask or other device that fits over the nose or nose and mouth. The mask is connected to a machine via the tube through which humidified air is blown. In the cases of obstructive sleep apnea, CPAP can reverse the complete blockages or narrowing of upper airways. Following diagnosis, CPAP machine pressures can be determined by a CPAP titration. Individuals who require CPAP can choose among masks and equipment that meet prescription and maximize comfort. Many become accustomed right away while others could require more time. Problems include uncomfortable masks or air leakage which can be adjusted to optimize compliance. \par \par problem 6: GERD\par -continue Omeprazole 40 mg before breakfast \par -Rule of 2s: avoid eating too much, eating too late, eating too spicy, eating two hours before bed\par -Things to avoid including overeating, spicy foods, tight clothing, eating within three hours of bed, this list is not all inclusive. \par -For treatment of reflux, possible options discussed including diet control, H2 blockers, PPIs, as well as coating motility agents discussed as treatment options. Timing of meals and proximity of last meal to sleep were discussed. If symptoms persist, a formal gastrointestinal evaluation is needed.\par \par problem 7: obesity \par -recommended regular exercise\par -mindful v mindless eating \par -Weight loss, exercise, and diet control were discussed and are highly encouraged. Treatment options were given such as, aqua therapy, and contacting a nutritionist. Recommended to use the elliptical, stationary bike, less use of treadmill.  Obesity is associated with worsening asthma, shortness of breath, and potential for cardiac disease, diabetes, and other underlying medical conditions.\par \par problem 8: poor breathing mechanics\par -Proper breathing techniques were reviewed with an emphasis of exhalation. Patient instructed to breath in for 1 second and out for four seconds. Patient was encouraged to not talk while walking. \par \par problem 9: r/o allergen profile- rediscussed \par -blood work to include: IgE level, eosinophil level, vitamin D level, food IgE level, and vitamin D level (prescription re-given)\par \par problem 10: r/o chronic fatigue\par -aside from OSAS other etiologies include thyroid disease, anemia, low testosterone levels, and vitamin deficiencies as well as medication effects. Based on this recommended: CBC, thyroid function test, vitamin D levels, sleep study, and free and total testosterone levels. Dr. Walton went over topic multiple times and discussed for an extensive period of time. \par \par problem 11: health maintenance \par -recommended to use Dr. Garcia's Intestinal Formula #1 \par -refused flu shot\par -recommended strep pneumonia vaccines: Prevnar-13 vaccine, followed by Pneumo vaccine 23 one year following\par -recommended early intervention for URIs\par -recommended regular osteoporosis evaluations\par -recommended early dermatological evaluations\par -recommended after the age of 50 to the age of 70, colonoscopy every 5 years \par \par Follow up in 4 months with spirometry and NiOx\par He is encouraged to call with any changes, concerns, or questions.

## 2020-04-23 ENCOUNTER — TRANSCRIPTION ENCOUNTER (OUTPATIENT)
Age: 45
End: 2020-04-23

## 2020-04-27 ENCOUNTER — APPOINTMENT (OUTPATIENT)
Dept: PULMONOLOGY | Facility: CLINIC | Age: 45
End: 2020-04-27
Payer: MEDICARE

## 2020-04-27 ENCOUNTER — LABORATORY RESULT (OUTPATIENT)
Age: 45
End: 2020-04-27

## 2020-04-27 VITALS
OXYGEN SATURATION: 98 % | BODY MASS INDEX: 40.04 KG/M2 | DIASTOLIC BLOOD PRESSURE: 75 MMHG | TEMPERATURE: 98.6 F | HEART RATE: 78 BPM | WEIGHT: 286 LBS | HEIGHT: 71 IN | RESPIRATION RATE: 17 BRPM | SYSTOLIC BLOOD PRESSURE: 140 MMHG

## 2020-04-27 PROCEDURE — 99214 OFFICE O/P EST MOD 30 MIN: CPT | Mod: 25

## 2020-04-27 PROCEDURE — 71046 X-RAY EXAM CHEST 2 VIEWS: CPT

## 2020-04-27 PROCEDURE — 99406 BEHAV CHNG SMOKING 3-10 MIN: CPT

## 2020-04-27 RX ORDER — LEVALBUTEROL HYDROCHLORIDE 0.31 MG/3ML
0.31 SOLUTION RESPIRATORY (INHALATION) EVERY 6 HOURS
Qty: 120 | Refills: 3 | Status: ACTIVE | COMMUNITY
Start: 2020-04-27 | End: 1900-01-01

## 2020-04-27 NOTE — PROCEDURE
[FreeTextEntry1] : CXR revealed a normal sized heart; there was no evidence of infiltrate or effusion-- A normal chest radiograph.

## 2020-04-27 NOTE — HISTORY OF PRESENT ILLNESS
[FreeTextEntry1] : Mr. Call is a 44 year old male presenting to the office for a follow up visit for asthma, allergic rhinitis, COPD, GERD, nicotine addiction, IMMANUEL, snoring, and shortness of breath. His chief complaint is \par -he has been in this health 6 days ago. \par -his other doctor informed them that his lungs were clear. \par -he is still smoking but is trying to quit. (less than a pack) \par -he has been coughing. \par -his breathing has been a little off. \par -has been taking 10mg of Prednisone which was prescribed by PCP Dr. Sen. \par -he is currently out of medication. \par -brings up a milky white phlegm. \par -sinuses are bring out clear mucus. \par -left sinus is congested. \par -denies fevers or chills. \par \par -He denies any chest pain, chest pressure, diarrhea, constipation, dysphagia, dizziness, sour taste in the mouth, leg swelling, leg pain, itchy eyes, itchy ears, heartburn, reflux, myalgias or arthralgias.

## 2020-04-27 NOTE — PHYSICAL EXAM
[General Appearance - Well Developed] : well developed [General Appearance - Well Nourished] : well nourished [Normal Appearance] : normal appearance [Well Groomed] : well groomed [No Deformities] : no deformities [General Appearance - In No Acute Distress] : no acute distress [Normal Conjunctiva] : the conjunctiva exhibited no abnormalities [Eyelids - No Xanthelasma] : the eyelids demonstrated no xanthelasmas [III] : III [Normal Oropharynx] : normal oropharynx [Neck Cervical Mass (___cm)] : no neck mass was observed [Jugular Venous Distention Increased] : there was no jugular-venous distention [Neck Appearance] : the appearance of the neck was normal [Heart Rate And Rhythm] : heart rate and rhythm were normal [Thyroid Nodule] : there were no palpable thyroid nodules [Thyroid Diffuse Enlargement] : the thyroid was not enlarged [Murmurs] : no murmurs present [Heart Sounds] : normal S1 and S2 [Respiration, Rhythm And Depth] : normal respiratory rhythm and effort [Exaggerated Use Of Accessory Muscles For Inspiration] : no accessory muscle use [Abdomen Tenderness] : non-tender [Abdomen Soft] : soft [Abdomen Mass (___ Cm)] : no abdominal mass palpated [Gait - Sufficient For Exercise Testing] : the gait was sufficient for exercise testing [Abnormal Walk] : normal gait [Petechial Hemorrhages (___cm)] : no petechial hemorrhages [Cyanosis, Localized] : no localized cyanosis [Nail Clubbing] : no clubbing of the fingernails [No Venous Stasis] : no venous stasis [Skin Color & Pigmentation] : normal skin color and pigmentation [] : no ischemic changes [No Xanthoma] : no  xanthoma was observed [No Skin Ulcers] : no skin ulcer [Skin Lesions] : no skin lesions [Deep Tendon Reflexes (DTR)] : deep tendon reflexes were 2+ and symmetric [No Focal Deficits] : no focal deficits [Sensation] : the sensory exam was normal to light touch and pinprick [Affect] : the affect was normal [Oriented To Time, Place, And Person] : oriented to person, place, and time [Impaired Insight] : insight and judgment were intact [FreeTextEntry1] : I:E 1:3, expiratory wheezes bilaterally

## 2020-04-27 NOTE — ASSESSMENT
[FreeTextEntry1] : Mr. Call is a 44 year old male active smoker with a history of obesity, COPD/asthma, heart murmur, IMMANUEL, borderline DM, and HTN coming in for pulmonary re-evaluation for his shortness of breath as he is noncompliant. He is still trying to quit smoking. He is currently in the midst of acute bronchitis / asthmatic bronchitis. (Non-complaint) \par \par His shortness of breath is felt to be multifactorial due to:\par -overweight\par -out of shape\par -poor breathing mechanics\par -COPD (rarely active)\par -asthma\par -? underlying cardiac disease (valvular heart disease)\par -sinus congestion\par \par problem 1: COPD/asthma - active\par -Add a course of Prednisone; 30 mg for 5 days, then 20 mg for 5 days, then 10 mg for 5 days \par Information sheet given to the patient to be reviewed, this medication is never to be used without consulting the prescribing physician. Proper dietary restraint is necessary specifically salt containing foods, if any reaction may occur should be reported. \par \par -continue Singulair 10 mg QHS\par -Add Incruse 1 inhalation QD \par -continue Breo 200 1 puff q day\par -continue Qvar 80 2 inhalations BID  \par -continue Proventil PRN and before exercise \par -Add Xopenex (0.3) TID via nebulizer \par \par -Complete blood work for asthma profile, food IgE panel, eosinophil level, IgE level, Vitamin D level \par \par Compliance was highly stressed and discussed with the patient the importance of regular inhaler and medication use. \par \par -COPD is a progressive disease and although it can’t be cured , appropriate management can slow its progression, reduce frequency and severity of exacerbations, and improve symptoms and the patient quality of life. Hospitalizations are the greatest contributor to the total COPD costs and account for up to 87% of total COPD related costs. Exacerbations are the main cause of admissions and subsequently account for the 40-75% of COPD costs. Inhaled maintenance therapy reduces the incidence of exacerbations in patients with stable COPD. Incorrect inhaler use and nonadherence are major obstacles to achieving COPD treatment goals. Many COPD patients have challenges (impaired inhalation, limited dexterity, reduced cognition: that limit their ability to correctly use their COPD treatment devices resulting in reduced symptom control. Of most importance is smoking cessation and early intervention with respiratory illnesses and contemplation for pulmonary rehab to enhance quality of life.\par -Asthma is  believed to be caused by inherited (genetic) and environmental factor, but its exact cause is unknown. Asthma may be triggered by allergens, lung infections, or irritants in the air. Asthma triggers are different for each person\par -Inhaler technique reviewed as well as oral hygiene techniques reviewed with patient. Avoidance of cold air, extremes of temperature, rescue inhaler should be used before exercise. Order of medication reviewed with patient. Recommended use of a cool mist humidifier in the bedroom. \par \par problem 2: ? cardiac component\par -recommended cardiac evaluation secondary to history of hx of murmur, diabetes, HTN, smoking, and obesity \par \par problem 3: allergic rhinitis/sinusitis\par -recommended the Sinugator for nasal rinsing\par -followed by Flonase 1 sniff/nostril BID - transition to Xhance 1 sniff BID\par -followed by Astelin 0.15 1 sniff/nostril BID \par \par problem 4: current every day smoker / nicotine addiction - (4/27/2020)\par -recommended to taper down cigarettes \par -recommended to try Nicotrol patches\par -Discussed for five minutes with the patient the risks/associations with continued smoking including COPD, emphysema, shortness of breath, renal cancer, bladder cancer, stroke risk, cardiac disease, etc. Smoking cessation was discussed at length and highly encouraged. Various options to aid cessation was discussed including use of Chantix, Nicotrol, nicotine products, laser therapy, hypnosis, Wellbutrin, etc. \par \par problem 5: IMMANUEL\par -continue to use the CPAP machine, benefiting, and tolerating it well\par -recommended to use humidifier and Mouth Kote\par -Sleep apnea is associated with adverse clinical consequences which an affect most organ systems.  Cardiovascular disease risk includes arrhythmias, atrial fibrillation, hypertension, coronary artery disease, and stroke. Metabolic disorders include diabetes type 2, non-alcoholic fatty liver disease. Mood disorder especially depression; and cognitive decline especially in the elderly. Associations with  chronic reflux/Luna’s esophagus some but not all inclusive. \par -Reasons to assess this include arousal consistent with hypopnea; respiratory events most prominent in REM sleep or supine position; therefore sleep staging and body position are important for accurate diagnosis and estimation of AHI.\par -According to the National Heart, Lung and Blood [place], CPAP or continuous positive airway pressure is a treatment that uses mild air pressure to keep breathing airways open. A CPAP machine includes a mask or other device that fits over the nose or nose and mouth. The mask is connected to a machine via the tube through which humidified air is blown. In the cases of obstructive sleep apnea, CPAP can reverse the complete blockages or narrowing of upper airways. Following diagnosis, CPAP machine pressures can be determined by a CPAP titration. Individuals who require CPAP can choose among masks and equipment that meet prescription and maximize comfort. Many become accustomed right away while others could require more time. Problems include uncomfortable masks or air leakage which can be adjusted to optimize compliance. \par \par problem 6: GERD\par -continue Omeprazole 40 mg before breakfast \par -Rule of 2s: avoid eating too much, eating too late, eating too spicy, eating two hours before bed\par -Things to avoid including overeating, spicy foods, tight clothing, eating within three hours of bed, this list is not all inclusive. \par -For treatment of reflux, possible options discussed including diet control, H2 blockers, PPIs, as well as coating motility agents discussed as treatment options. Timing of meals and proximity of last meal to sleep were discussed. If symptoms persist, a formal gastrointestinal evaluation is needed.\par \par problem 7: obesity \par -recommended regular exercise\par -mindful v mindless eating \par -Weight loss, exercise, and diet control were discussed and are highly encouraged. Treatment options were given such as, aqua therapy, and contacting a nutritionist. Recommended to use the elliptical, stationary bike, less use of treadmill.  Obesity is associated with worsening asthma, shortness of breath, and potential for cardiac disease, diabetes, and other underlying medical conditions.\par \par problem 8: poor breathing mechanics\par -Proper breathing techniques were reviewed with an emphasis of exhalation. Patient instructed to breath in for 1 second and out for four seconds. Patient was encouraged to not talk while walking. \par \par problem 9: r/o allergen profile- rediscussed \par -blood work to include: IgE level, eosinophil level, vitamin D level, food IgE level, and vitamin D level (prescription re-given)\par \par problem 10: r/o chronic fatigue\par -aside from OSAS other etiologies include thyroid disease, anemia, low testosterone levels, and vitamin deficiencies as well as medication effects. Based on this recommended: CBC, thyroid function test, vitamin D levels, sleep study, and free and total testosterone levels. Dr. Walton went over topic multiple times and discussed for an extensive period of time. \par \par Problem 11: Health Maintenance/COVID19 Precautions:\par - Clean your hands often. Wash your hands often with soap and water for at least 20 seconds, especially after blowing your nose, coughing, or sneezing, or having been in a public place.\par - If soap and water are not available, use a hand  that contains at least 60% alcohol.\par - To the extent possible, avoid touching high-touch surfaces in public places - elevator buttons, door handles, handrails, handshaking with people, etc. Use a tissue or your sleeve to cover your hand or finger if you must touch something.\par - Wash your hands after touching surfaces in public places.\par - Avoid touching your face, nose, eyes, etc.\par - Clean and disinfect your home to remove germs: practice routine cleaning of frequently touched surfaces (for example: tables, doorknobs, light switches, handles, desks, toilets, faucets, sinks & cell phones)\par - Avoid crowds, especially in poorly ventilated spaces. Your risk of exposure to respiratory viruses like COVID-19 may increase in crowded, closed-in settings with little air circulation if\par there are people in the crowd who are sick. All patients are recommended to practice social distancing and stay at least 6 feet away from others.\par - Avoid all non-essential travel including plane trips, and especially avoid embarking on cruise ships.\par -If COVID-19 is spreading in your community, take extra measures to put distance between yourself and other people to further reduce your risk of being exposed to this new virus.\par -Stay home as much as possible.\par - Consider ways of getting food brought to your house through family, social, or commercial networks\par -Be aware that the virus has been known to live in the air up to 3 hours post exposure, cardboard up to 24 hours post exposure, copper up to 4 hours post exposure, steel and plastic up to 2-3 days post exposure. Risk of transmission from these surfaces are affected by many variables.\par \par Immune Support Recommendations:\par -OTC Vitamin C 500mg BID \par -OTC Quercetin 250-500mg BID \par -OTC Zinc 75-100mg per day \par -OTC Melatonin 1or 2mg a night \par -OTC Vitamin D 1-4000mg per day\par -Tonic Water 8oz\par -Recommended to Stay Hydrated (At least a gallon/day)\par \par Asthma and COVID19:\par You need to make sure your asthma is under control. This often requires the use of inhaled corticosteroids (and sometimes oral corticosteroids). Inhaled corticosteroids do not likely reduce your immune system’s ability to fight infections, but oral corticosteroids may. It is important to use the steps above to protect yourself to limit your exposure to any respiratory virus. \par \par problem 12: health maintenance \par -recommended to use Dr. Garcia's Intestinal Formula #1 \par -refused flu shot\par -recommended strep pneumonia vaccines: Prevnar-13 vaccine, followed by Pneumo vaccine 23 one year following\par -recommended early intervention for URIs\par -recommended regular osteoporosis evaluations\par -recommended early dermatological evaluations\par -recommended after the age of 50 to the age of 70, colonoscopy every 5 years \par \par Follow up in 4 months with spirometry and NiOx\par He is encouraged to call with any changes, concerns, or questions.

## 2020-04-27 NOTE — ADDENDUM
[FreeTextEntry1] : Documented by Percy West acting as a scribe for Dr. Trey Walton on 04/27/2020 \par \par All medical record entries made by the Scribe were at my, Dr. Trey Walton's, direction and personally dictated by me on 04/27/2020 . I have reviewed the chart and agree that the record accurately reflects my personal performance of the history, physical exam, assessment and plan. I have also personally directed, reviewed, and agree with the discharge instructions.

## 2020-04-28 LAB
24R-OH-CALCIDIOL SERPL-MCNC: 68.5 PG/ML
25(OH)D3 SERPL-MCNC: 40.5 NG/ML
A ALTERNATA IGE QN: <0.1 KUA/L
A FUMIGATUS IGE QN: <0.1 KUA/L
BASOPHILS # BLD AUTO: 0.04 K/UL
BASOPHILS NFR BLD AUTO: 0.4 %
C ALBICANS IGE QN: <0.1 KUA/L
C HERBARUM IGE QN: <0.1 KUA/L
CAT DANDER IGE QN: <0.1 KUA/L
CLAM IGE QN: 0.76 KUA/L
CODFISH IGE QN: <0.1 KUA/L
COMMON RAGWEED IGE QN: <0.1 KUA/L
CORN IGE QN: <0.1 KUA/L
COW MILK IGE QN: <0.1 KUA/L
D FARINAE IGE QN: 1.65 KUA/L
D PTERONYSS IGE QN: 1.58 KUA/L
DEPRECATED A ALTERNATA IGE RAST QL: 0
DEPRECATED A FUMIGATUS IGE RAST QL: 0
DEPRECATED C ALBICANS IGE RAST QL: 0
DEPRECATED C HERBARUM IGE RAST QL: 0
DEPRECATED CAT DANDER IGE RAST QL: 0
DEPRECATED CLAM IGE RAST QL: 2
DEPRECATED CODFISH IGE RAST QL: 0
DEPRECATED COMMON RAGWEED IGE RAST QL: 0
DEPRECATED CORN IGE RAST QL: 0
DEPRECATED COW MILK IGE RAST QL: 0
DEPRECATED D FARINAE IGE RAST QL: 2
DEPRECATED D PTERONYSS IGE RAST QL: 2
DEPRECATED DOG DANDER IGE RAST QL: 0
DEPRECATED EGG WHITE IGE RAST QL: 0
DEPRECATED M RACEMOSUS IGE RAST QL: 0
DEPRECATED PEANUT IGE RAST QL: 0
DEPRECATED ROACH IGE RAST QL: NORMAL
DEPRECATED SCALLOP IGE RAST QL: 0.18 KUA/L
DEPRECATED SESAME SEED IGE RAST QL: 0
DEPRECATED SHRIMP IGE RAST QL: 0
DEPRECATED SOYBEAN IGE RAST QL: 0
DEPRECATED TIMOTHY IGE RAST QL: 0
DEPRECATED WALNUT IGE RAST QL: 0
DEPRECATED WHEAT IGE RAST QL: 0
DEPRECATED WHITE OAK IGE RAST QL: 0
DOG DANDER IGE QN: <0.1 KUA/L
EGG WHITE IGE QN: <0.1 KUA/L
EOSINOPHIL # BLD AUTO: 0.1 K/UL
EOSINOPHIL NFR BLD AUTO: 0.9 %
HCT VFR BLD CALC: 49.3 %
HGB BLD-MCNC: 16.5 G/DL
IMM GRANULOCYTES NFR BLD AUTO: 0.5 %
LYMPHOCYTES # BLD AUTO: 2.7 K/UL
LYMPHOCYTES NFR BLD AUTO: 23.7 %
M RACEMOSUS IGE QN: <0.1 KUA/L
MAN DIFF?: NORMAL
MCHC RBC-ENTMCNC: 32.3 PG
MCHC RBC-ENTMCNC: 33.5 GM/DL
MCV RBC AUTO: 96.5 FL
MONOCYTES # BLD AUTO: 1.29 K/UL
MONOCYTES NFR BLD AUTO: 11.3 %
NEUTROPHILS # BLD AUTO: 7.18 K/UL
NEUTROPHILS NFR BLD AUTO: 63.2 %
PEANUT IGE QN: <0.1 KUA/L
PLATELET # BLD AUTO: 220 K/UL
RBC # BLD: 5.11 M/UL
RBC # FLD: 12.9 %
ROACH IGE QN: 0.13 KUA/L
SCALLOP IGE QN: <0.1 KUA/L
SCALLOP IGE QN: NORMAL
SESAME SEED IGE QN: <0.1 KUA/L
SOYBEAN IGE QN: <0.1 KUA/L
TIMOTHY IGE QN: <0.1 KUA/L
TOTAL IGE SMQN RAST: 81 KU/L
WALNUT IGE QN: <0.1 KUA/L
WBC # FLD AUTO: 11.37 K/UL
WHEAT IGE QN: <0.1 KUA/L
WHITE OAK IGE QN: <0.1 KUA/L

## 2020-05-01 RX ORDER — ALBUTEROL SULFATE 90 UG/1
108 (90 BASE) AEROSOL, METERED RESPIRATORY (INHALATION)
Qty: 18 | Refills: 3 | Status: ACTIVE | COMMUNITY
Start: 2019-07-15 | End: 1900-01-01

## 2020-05-14 ENCOUNTER — APPOINTMENT (OUTPATIENT)
Dept: PULMONOLOGY | Facility: CLINIC | Age: 45
End: 2020-05-14

## 2020-05-14 NOTE — ADDENDUM
[FreeTextEntry1] : Documented by Giselle Sandra acting as a scribe for Dr. Trey Walton on 05/14/2020.\par \par All medical record entries made by the Scribe were at my, Dr. Trey Walton's, direction and personally dictated by me on 05/14/2020. I have reviewed the chart and agree that the record accurately reflects my personal performance of the history, physical exam, assessment and plan. I have also personally directed, reviewed, and agree with the discharge instructions.\par

## 2020-05-14 NOTE — ASSESSMENT
[FreeTextEntry1] : Mr. Call is a 44 year old male active smoker with a history of obesity, COPD/asthma, heart murmur, IMMANUEL, borderline DM, and HTN. He was spoken to via video call for follow up. pulmonary re-evaluation for his shortness of breath as he is noncompliant. He is still trying to quit smoking.  He is currently in the midst of acute bronchitis / asthmatic bronchitis. (Non-complaint) \par \par His shortness of breath is felt to be multifactorial due to:\par -overweight\par -out of shape\par -poor breathing mechanics\par -COPD (rarely active)\par -asthma\par -? underlying cardiac disease (valvular heart disease)\par -sinus congestion\par \par problem 1: COPD/asthma - active\par -Add a course of Prednisone; 30 mg for 5 days, then 20 mg for 5 days, then 10 mg for 5 days \par Information sheet given to the patient to be reviewed, this medication is never to be used without consulting the prescribing physician. Proper dietary restraint is necessary specifically salt containing foods, if any reaction may occur should be reported. \par \par -continue Singulair 10 mg QHS\par -Add Incruse 1 inhalation QD \par -continue Breo 200 1 puff q day\par -continue Qvar 80 2 inhalations BID  \par -continue Proventil PRN and before exercise \par -Add Xopenex (0.3) TID via nebulizer \par \par -Complete blood work for asthma profile, food IgE panel, eosinophil level, IgE level, Vitamin D level \par \par Compliance was highly stressed and discussed with the patient the importance of regular inhaler and medication use. \par \par -COPD is a progressive disease and although it can’t be cured , appropriate management can slow its progression, reduce frequency and severity of exacerbations, and improve symptoms and the patient quality of life. Hospitalizations are the greatest contributor to the total COPD costs and account for up to 87% of total COPD related costs. Exacerbations are the main cause of admissions and subsequently account for the 40-75% of COPD costs. Inhaled maintenance therapy reduces the incidence of exacerbations in patients with stable COPD. Incorrect inhaler use and nonadherence are major obstacles to achieving COPD treatment goals. Many COPD patients have challenges (impaired inhalation, limited dexterity, reduced cognition: that limit their ability to correctly use their COPD treatment devices resulting in reduced symptom control. Of most importance is smoking cessation and early intervention with respiratory illnesses and contemplation for pulmonary rehab to enhance quality of life.\par -Asthma is  believed to be caused by inherited (genetic) and environmental factor, but its exact cause is unknown. Asthma may be triggered by allergens, lung infections, or irritants in the air. Asthma triggers are different for each person\par -Inhaler technique reviewed as well as oral hygiene techniques reviewed with patient. Avoidance of cold air, extremes of temperature, rescue inhaler should be used before exercise. Order of medication reviewed with patient. Recommended use of a cool mist humidifier in the bedroom. \par \par problem 2: ? cardiac component\par -recommended cardiac evaluation secondary to history of hx of murmur, diabetes, HTN, smoking, and obesity \par \par problem 3: allergic rhinitis/sinusitis\par -recommended the Sinugator for nasal rinsing\par -followed by Flonase 1 sniff/nostril BID - transition to Xhance 1 sniff BID\par -followed by Astelin 0.15 1 sniff/nostril BID \par \par problem 4: current every day smoker / nicotine addiction - (4/27/2020)\par -recommended to taper down cigarettes \par -recommended to try Nicotrol patches\par -Discussed for five minutes with the patient the risks/associations with continued smoking including COPD, emphysema, shortness of breath, renal cancer, bladder cancer, stroke risk, cardiac disease, etc. Smoking cessation was discussed at length and highly encouraged. Various options to aid cessation was discussed including use of Chantix, Nicotrol, nicotine products, laser therapy, hypnosis, Wellbutrin, etc. \par \par problem 5: IMMANUEL\par -continue to use the CPAP machine, benefiting, and tolerating it well\par -recommended to use humidifier and Mouth Kote\par -Sleep apnea is associated with adverse clinical consequences which an affect most organ systems.  Cardiovascular disease risk includes arrhythmias, atrial fibrillation, hypertension, coronary artery disease, and stroke. Metabolic disorders include diabetes type 2, non-alcoholic fatty liver disease. Mood disorder especially depression; and cognitive decline especially in the elderly. Associations with  chronic reflux/Luna’s esophagus some but not all inclusive. \par -Reasons to assess this include arousal consistent with hypopnea; respiratory events most prominent in REM sleep or supine position; therefore sleep staging and body position are important for accurate diagnosis and estimation of AHI.\par -According to the National Heart, Lung and Blood [place], CPAP or continuous positive airway pressure is a treatment that uses mild air pressure to keep breathing airways open. A CPAP machine includes a mask or other device that fits over the nose or nose and mouth. The mask is connected to a machine via the tube through which humidified air is blown. In the cases of obstructive sleep apnea, CPAP can reverse the complete blockages or narrowing of upper airways. Following diagnosis, CPAP machine pressures can be determined by a CPAP titration. Individuals who require CPAP can choose among masks and equipment that meet prescription and maximize comfort. Many become accustomed right away while others could require more time. Problems include uncomfortable masks or air leakage which can be adjusted to optimize compliance. \par \par problem 6: GERD\par -continue Omeprazole 40 mg before breakfast \par -Rule of 2s: avoid eating too much, eating too late, eating too spicy, eating two hours before bed\par -Things to avoid including overeating, spicy foods, tight clothing, eating within three hours of bed, this list is not all inclusive. \par -For treatment of reflux, possible options discussed including diet control, H2 blockers, PPIs, as well as coating motility agents discussed as treatment options. Timing of meals and proximity of last meal to sleep were discussed. If symptoms persist, a formal gastrointestinal evaluation is needed.\par \par problem 7: obesity \par -recommended regular exercise\par -mindful v mindless eating \par -Weight loss, exercise, and diet control were discussed and are highly encouraged. Treatment options were given such as, aqua therapy, and contacting a nutritionist. Recommended to use the elliptical, stationary bike, less use of treadmill.  Obesity is associated with worsening asthma, shortness of breath, and potential for cardiac disease, diabetes, and other underlying medical conditions.\par \par problem 8: poor breathing mechanics\par -Proper breathing techniques were reviewed with an emphasis of exhalation. Patient instructed to breath in for 1 second and out for four seconds. Patient was encouraged to not talk while walking. \par \par problem 9: r/o allergen profile- rediscussed \par -blood work to include: IgE level, eosinophil level, vitamin D level, food IgE level, and vitamin D level (prescription re-given)\par \par problem 10: r/o chronic fatigue\par -aside from OSAS other etiologies include thyroid disease, anemia, low testosterone levels, and vitamin deficiencies as well as medication effects. Based on this recommended: CBC, thyroid function test, vitamin D levels, sleep study, and free and total testosterone levels. Dr. Walton went over topic multiple times and discussed for an extensive period of time. \par \par Problem 11: Health Maintenance/COVID19 Precautions:\par - Clean your hands often. Wash your hands often with soap and water for at least 20 seconds, especially after blowing your nose, coughing, or sneezing, or having been in a public place.\par - If soap and water are not available, use a hand  that contains at least 60% alcohol.\par - To the extent possible, avoid touching high-touch surfaces in public places - elevator buttons, door handles, handrails, handshaking with people, etc. Use a tissue or your sleeve to cover your hand or finger if you must touch something.\par - Wash your hands after touching surfaces in public places.\par - Avoid touching your face, nose, eyes, etc.\par - Clean and disinfect your home to remove germs: practice routine cleaning of frequently touched surfaces (for example: tables, doorknobs, light switches, handles, desks, toilets, faucets, sinks & cell phones)\par - Avoid crowds, especially in poorly ventilated spaces. Your risk of exposure to respiratory viruses like COVID-19 may increase in crowded, closed-in settings with little air circulation if\par there are people in the crowd who are sick. All patients are recommended to practice social distancing and stay at least 6 feet away from others.\par - Avoid all non-essential travel including plane trips, and especially avoid embarking on cruise ships.\par -If COVID-19 is spreading in your community, take extra measures to put distance between yourself and other people to further reduce your risk of being exposed to this new virus.\par -Stay home as much as possible.\par - Consider ways of getting food brought to your house through family, social, or commercial networks\par -Be aware that the virus has been known to live in the air up to 3 hours post exposure, cardboard up to 24 hours post exposure, copper up to 4 hours post exposure, steel and plastic up to 2-3 days post exposure. Risk of transmission from these surfaces are affected by many variables.\par \par Immune Support Recommendations:\par -OTC Vitamin C 500mg BID \par -OTC Quercetin 250-500mg BID \par -OTC Zinc 75-100mg per day \par -OTC Melatonin 1or 2mg a night \par -OTC Vitamin D 1-4000mg per day\par -Tonic Water 8oz\par -Recommended to Stay Hydrated (At least a gallon/day)\par \par Asthma and COVID19:\par You need to make sure your asthma is under control. This often requires the use of inhaled corticosteroids (and sometimes oral corticosteroids). Inhaled corticosteroids do not likely reduce your immune system’s ability to fight infections, but oral corticosteroids may. It is important to use the steps above to protect yourself to limit your exposure to any respiratory virus. \par \par problem 12: health maintenance \par -recommended to use Dr. Garcia's Intestinal Formula #1 \par -refused flu shot\par -recommended strep pneumonia vaccines: Prevnar-13 vaccine, followed by Pneumo vaccine 23 one year following\par -recommended early intervention for URIs\par -recommended regular osteoporosis evaluations\par -recommended early dermatological evaluations\par -recommended after the age of 50 to the age of 70, colonoscopy every 5 years \par \par Follow up in 4 months with spirometry and NiOx\par He is encouraged to call with any changes, concerns, or questions.

## 2020-05-14 NOTE — HISTORY OF PRESENT ILLNESS
[Home] : at home, [unfilled] , at the time of the visit. [Medical Office: ([facility])___] : at the medical office located in  [Self] : self [Patient] : the patient [FreeTextEntry2] : JAZMYN MURGUIA  [FreeTextEntry1] : Mr. Call is a 44 year old male who was spoken to via video call for a follow up visit for asthma, allergic rhinitis, COPD, GERD, nicotine addiction, IMMANUEL, snoring, and shortness of breath. His chief complaint is \par \par -he denies any headaches, nausea, vomiting, fever, chills, sweats, chest pain, chest pressure, diarrhea, constipation, dysphagia, dizziness, sour taste in the mouth, leg swelling, leg pain, itchy eyes, itchy ears, heartburn, reflux, myalgias or arthralgias.\par

## 2020-05-14 NOTE — REASON FOR VISIT
[Follow-Up] : a follow-up visit [FreeTextEntry1] : video call - asthma, allergic rhinitis, COPD, GERD, nicotine addiction, IMMANUEL, snoring, and shortness of breath

## 2020-05-24 ENCOUNTER — RX RENEWAL (OUTPATIENT)
Age: 45
End: 2020-05-24

## 2020-05-29 ENCOUNTER — APPOINTMENT (OUTPATIENT)
Dept: PULMONOLOGY | Facility: CLINIC | Age: 45
End: 2020-05-29
Payer: MEDICARE

## 2020-05-29 VITALS
HEART RATE: 66 BPM | OXYGEN SATURATION: 98 % | TEMPERATURE: 99 F | SYSTOLIC BLOOD PRESSURE: 124 MMHG | HEIGHT: 71 IN | BODY MASS INDEX: 40.04 KG/M2 | WEIGHT: 286 LBS | DIASTOLIC BLOOD PRESSURE: 60 MMHG | RESPIRATION RATE: 17 BRPM

## 2020-05-29 DIAGNOSIS — J45.901 ACUTE BRONCHITIS, UNSPECIFIED: ICD-10-CM

## 2020-05-29 DIAGNOSIS — J20.9 ACUTE BRONCHITIS, UNSPECIFIED: ICD-10-CM

## 2020-05-29 PROCEDURE — 99214 OFFICE O/P EST MOD 30 MIN: CPT

## 2020-05-29 RX ORDER — PREDNISONE 10 MG/1
10 TABLET ORAL
Qty: 50 | Refills: 0 | Status: DISCONTINUED | COMMUNITY
Start: 2020-04-27 | End: 2020-05-29

## 2020-05-29 RX ORDER — PREDNISONE 10 MG/1
10 TABLET ORAL
Qty: 50 | Refills: 0 | Status: DISCONTINUED | COMMUNITY
Start: 2019-05-09 | End: 2020-05-29

## 2020-05-29 RX ORDER — ACLIDINIUM BROMIDE 400 UG/1
400 POWDER, METERED RESPIRATORY (INHALATION) TWICE DAILY
Qty: 3 | Refills: 1 | Status: DISCONTINUED | COMMUNITY
Start: 2018-08-09 | End: 2020-05-29

## 2020-05-29 RX ORDER — AZITHROMYCIN 500 MG/1
500 TABLET, FILM COATED ORAL DAILY
Qty: 7 | Refills: 0 | Status: DISCONTINUED | COMMUNITY
Start: 2020-01-06 | End: 2020-05-29

## 2020-05-29 RX ORDER — ACLIDINIUM BROMIDE 400 UG/1
400 POWDER, METERED RESPIRATORY (INHALATION) TWICE DAILY
Qty: 3 | Refills: 1 | Status: DISCONTINUED | COMMUNITY
Start: 2017-12-14 | End: 2020-05-29

## 2020-05-29 RX ORDER — DESLORATADINE 5 MG/1
5 TABLET ORAL DAILY
Qty: 90 | Refills: 1 | Status: ACTIVE | COMMUNITY
Start: 2020-05-29 | End: 1900-01-01

## 2020-05-29 RX ORDER — PREDNISONE 10 MG/1
10 TABLET ORAL
Qty: 50 | Refills: 0 | Status: DISCONTINUED | COMMUNITY
Start: 2020-01-06 | End: 2020-05-29

## 2020-05-29 NOTE — ASSESSMENT
[FreeTextEntry1] : Mr. Call is a 44 year old male active smoker with a history of obesity, COPD/asthma, heart murmur, IMMANUEL, borderline DM, and HTN coming in for pulmonary re-evaluation for his shortness of breath as he is noncompliant. He is still trying to quit smoking. He is currently s/p acute bronchitis / asthmatic bronchitis. (Non-complaint) - improved. \par \par His shortness of breath is felt to be multifactorial due to:\par -overweight\par -out of shape\par -poor breathing mechanics\par -COPD (rarely active)\par -asthma\par -? underlying cardiac disease (valvular heart disease)\par -sinus congestion\par \par problem 1: COPD/asthma (controlled)\par -continue Singulair 10 mg QHS\par -continue Incruse 1 inhalation QD \par -continue Breo 200 1 puff q day\par -continue Qvar 80 2 inhalations BID  \par -continue Proventil PRN and before exercise \par -Add Xopenex (0.3) TID via nebulizer \par \par -s/p blood work for + asthma profile, + food IgE panel, - eosinophil level, - IgE level, - Vitamin D level \par \par Compliance was highly stressed and discussed with the patient the importance of regular inhaler and medication use. \par \par -COPD is a progressive disease and although it can’t be cured , appropriate management can slow its progression, reduce frequency and severity of exacerbations, and improve symptoms and the patient quality of life. Hospitalizations are the greatest contributor to the total COPD costs and account for up to 87% of total COPD related costs. Exacerbations are the main cause of admissions and subsequently account for the 40-75% of COPD costs. Inhaled maintenance therapy reduces the incidence of exacerbations in patients with stable COPD. Incorrect inhaler use and nonadherence are major obstacles to achieving COPD treatment goals. Many COPD patients have challenges (impaired inhalation, limited dexterity, reduced cognition: that limit their ability to correctly use their COPD treatment devices resulting in reduced symptom control. Of most importance is smoking cessation and early intervention with respiratory illnesses and contemplation for pulmonary rehab to enhance quality of life.\par -Asthma is  believed to be caused by inherited (genetic) and environmental factor, but its exact cause is unknown. Asthma may be triggered by allergens, lung infections, or irritants in the air. Asthma triggers are different for each person\par -Inhaler technique reviewed as well as oral hygiene techniques reviewed with patient. Avoidance of cold air, extremes of temperature, rescue inhaler should be used before exercise. Order of medication reviewed with patient. Recommended use of a cool mist humidifier in the bedroom. \par \par problem 2: ? cardiac component\par -recommended cardiac evaluation secondary to history of hx of murmur, diabetes, HTN, smoking, and obesity \par \par problem 3: allergic rhinitis/sinusitis\par - add Clarinex 5 mg QAM\par -recommended the Sinugator for nasal rinsing\par -followed by Flonase 1 sniff/nostril BID - transition to Xhance 1 sniff BID\par -followed by Astelin 0.15 1 sniff/nostril BID \par \par problem 4: current every day smoker / nicotine addiction - (4/27/2020)\par -recommended to taper down cigarettes \par -recommended to try Nicotrol patches\par -Discussed for five minutes with the patient the risks/associations with continued smoking including COPD, emphysema, shortness of breath, renal cancer, bladder cancer, stroke risk, cardiac disease, etc. Smoking cessation was discussed at length and highly encouraged. Various options to aid cessation was discussed including use of Chantix, Nicotrol, nicotine products, laser therapy, hypnosis, Wellbutrin, etc. \par \par problem 5: IMMANUEL\par -continue to use the CPAP machine, benefiting, and tolerating it well\par -recommended to use humidifier and Mouth Kote\par -Sleep apnea is associated with adverse clinical consequences which an affect most organ systems.  Cardiovascular disease risk includes arrhythmias, atrial fibrillation, hypertension, coronary artery disease, and stroke. Metabolic disorders include diabetes type 2, non-alcoholic fatty liver disease. Mood disorder especially depression; and cognitive decline especially in the elderly. Associations with  chronic reflux/Luna’s esophagus some but not all inclusive. \par -Reasons to assess this include arousal consistent with hypopnea; respiratory events most prominent in REM sleep or supine position; therefore sleep staging and body position are important for accurate diagnosis and estimation of AHI.\par -According to the National Heart, Lung and Blood [place], CPAP or continuous positive airway pressure is a treatment that uses mild air pressure to keep breathing airways open. A CPAP machine includes a mask or other device that fits over the nose or nose and mouth. The mask is connected to a machine via the tube through which humidified air is blown. In the cases of obstructive sleep apnea, CPAP can reverse the complete blockages or narrowing of upper airways. Following diagnosis, CPAP machine pressures can be determined by a CPAP titration. Individuals who require CPAP can choose among masks and equipment that meet prescription and maximize comfort. Many become accustomed right away while others could require more time. Problems include uncomfortable masks or air leakage which can be adjusted to optimize compliance. \par \par problem 6: GERD\par -continue Omeprazole 40 mg before breakfast \par -Rule of 2s: avoid eating too much, eating too late, eating too spicy, eating two hours before bed\par -Things to avoid including overeating, spicy foods, tight clothing, eating within three hours of bed, this list is not all inclusive. \par -For treatment of reflux, possible options discussed including diet control, H2 blockers, PPIs, as well as coating motility agents discussed as treatment options. Timing of meals and proximity of last meal to sleep were discussed. If symptoms persist, a formal gastrointestinal evaluation is needed.\par \par problem 7: obesity \par -recommended regular exercise\par -mindful v mindless eating \par -Weight loss, exercise, and diet control were discussed and are highly encouraged. Treatment options were given such as, aqua therapy, and contacting a nutritionist. Recommended to use the elliptical, stationary bike, less use of treadmill.  Obesity is associated with worsening asthma, shortness of breath, and potential for cardiac disease, diabetes, and other underlying medical conditions.\par \par problem 8: poor breathing mechanics\par -Proper breathing techniques were reviewed with an emphasis of exhalation. Patient instructed to breath in for 1 second and out for four seconds. Patient was encouraged to not talk while walking. \par \par problem 9: r/o allergen profile- rediscussed \par -s/p blood work to include: - IgE level, - eosinophil level, - vitamin D level, + food IgE level, and + allergy panel(prescription re-given)\par \par problem 10: r/o chronic fatigue\par -aside from OSAS other etiologies include thyroid disease, anemia, low testosterone levels, and vitamin deficiencies as well as medication effects. Based on this recommended: CBC, thyroid function test, vitamin D levels, sleep study, and free and total testosterone levels. Dr. Walton went over topic multiple times and discussed for an extensive period of time. \par \par Problem 11: Health Maintenance/COVID19 Precautions:\par - Clean your hands often. Wash your hands often with soap and water for at least 20 seconds, especially after blowing your nose, coughing, or sneezing, or having been in a public place.\par - If soap and water are not available, use a hand  that contains at least 60% alcohol.\par - To the extent possible, avoid touching high-touch surfaces in public places - elevator buttons, door handles, handrails, handshaking with people, etc. Use a tissue or your sleeve to cover your hand or finger if you must touch something.\par - Wash your hands after touching surfaces in public places.\par - Avoid touching your face, nose, eyes, etc.\par - Clean and disinfect your home to remove germs: practice routine cleaning of frequently touched surfaces (for example: tables, doorknobs, light switches, handles, desks, toilets, faucets, sinks & cell phones)\par - Avoid crowds, especially in poorly ventilated spaces. Your risk of exposure to respiratory viruses like COVID-19 may increase in crowded, closed-in settings with little air circulation if\par there are people in the crowd who are sick. All patients are recommended to practice social distancing and stay at least 6 feet away from others.\par - Avoid all non-essential travel including plane trips, and especially avoid embarking on cruise ships.\par -If COVID-19 is spreading in your community, take extra measures to put distance between yourself and other people to further reduce your risk of being exposed to this new virus.\par -Stay home as much as possible.\par - Consider ways of getting food brought to your house through family, social, or commercial networks\par -Be aware that the virus has been known to live in the air up to 3 hours post exposure, cardboard up to 24 hours post exposure, copper up to 4 hours post exposure, steel and plastic up to 2-3 days post exposure. Risk of transmission from these surfaces are affected by many variables.\par \par Immune Support Recommendations:\par -OTC Vitamin C 500mg BID \par -OTC Quercetin 250-500mg BID \par -OTC Zinc 75-100mg per day \par -OTC Melatonin 1or 2mg a night \par -OTC Vitamin D 1-4000mg per day\par -Tonic Water 8oz\par -Recommended to Stay Hydrated (At least a gallon/day)\par \par Asthma and COVID19:\par You need to make sure your asthma is under control. This often requires the use of inhaled corticosteroids (and sometimes oral corticosteroids). Inhaled corticosteroids do not likely reduce your immune system’s ability to fight infections, but oral corticosteroids may. It is important to use the steps above to protect yourself to limit your exposure to any respiratory virus. \par \par problem 12: health maintenance \par -recommended to use Dr. Garcia's Intestinal Formula #1 \par -refused flu shot\par -recommended strep pneumonia vaccines: Prevnar-13 vaccine, followed by Pneumo vaccine 23 one year following\par -recommended early intervention for URIs\par -recommended regular osteoporosis evaluations\par -recommended early dermatological evaluations\par -recommended after the age of 50 to the age of 70, colonoscopy every 5 years \par \par Follow up in 4 months with spirometry and NiOx\par He is encouraged to call with any changes, concerns, or questions.

## 2020-05-29 NOTE — PHYSICAL EXAM
[General Appearance - Well Developed] : well developed [Normal Appearance] : normal appearance [Well Groomed] : well groomed [General Appearance - Well Nourished] : well nourished [General Appearance - In No Acute Distress] : no acute distress [Normal Conjunctiva] : the conjunctiva exhibited no abnormalities [No Deformities] : no deformities [Eyelids - No Xanthelasma] : the eyelids demonstrated no xanthelasmas [III] : III [Normal Oropharynx] : normal oropharynx [Neck Appearance] : the appearance of the neck was normal [Neck Cervical Mass (___cm)] : no neck mass was observed [Thyroid Diffuse Enlargement] : the thyroid was not enlarged [Jugular Venous Distention Increased] : there was no jugular-venous distention [Thyroid Nodule] : there were no palpable thyroid nodules [Heart Rate And Rhythm] : heart rate and rhythm were normal [Murmurs] : no murmurs present [Heart Sounds] : normal S1 and S2 [Exaggerated Use Of Accessory Muscles For Inspiration] : no accessory muscle use [Respiration, Rhythm And Depth] : normal respiratory rhythm and effort [Abdomen Soft] : soft [Abdomen Tenderness] : non-tender [Abdomen Mass (___ Cm)] : no abdominal mass palpated [Abnormal Walk] : normal gait [Gait - Sufficient For Exercise Testing] : the gait was sufficient for exercise testing [Nail Clubbing] : no clubbing of the fingernails [Cyanosis, Localized] : no localized cyanosis [Petechial Hemorrhages (___cm)] : no petechial hemorrhages [Skin Color & Pigmentation] : normal skin color and pigmentation [] : no rash [No Venous Stasis] : no venous stasis [Skin Lesions] : no skin lesions [No Skin Ulcers] : no skin ulcer [No Xanthoma] : no  xanthoma was observed [Deep Tendon Reflexes (DTR)] : deep tendon reflexes were 2+ and symmetric [Sensation] : the sensory exam was normal to light touch and pinprick [No Focal Deficits] : no focal deficits [Impaired Insight] : insight and judgment were intact [Oriented To Time, Place, And Person] : oriented to person, place, and time [Affect] : the affect was normal [FreeTextEntry1] : I:E 1:3, clear

## 2020-05-29 NOTE — ADDENDUM
[FreeTextEntry1] : Documented by Karmen Padilla acting as a scribe for Dr. Trey Walton on (05/29/2020).\par \par All medical record entries made by the Scribe were at my, Dr. Trey Walton's, direction and personally dictated by me on (05/29/2020). I have reviewed the chart and agree that the record accurately reflects my personal performance of the history, physical exam, assessment and plan. I have also personally directed, reviewed, and agree with the discharge instructions. \par \par \par

## 2020-05-29 NOTE — HISTORY OF PRESENT ILLNESS
[FreeTextEntry1] : Mr. Call is a 44 year old male presenting to the office for a follow up visit for asthma, allergic rhinitis, COPD, GERD, nicotine addiction, IMMANUEL, snoring, and shortness of breath.\par - He is doing better than his previous visit \par - He is sleeping well \par - He is using CPAP and tolerating it well \par - No hoarseness\par - His breathing has improved\par - He is not wheezing \par - He is still coughing \par - He is smoking 2 cigarettes a day but he is not inhaling \par - He lost weight \par - He is using his inhalers regularly\par - He has nasal congestion when he goes to bed at night, but it resolves by the morning \par - denies any headaches, nausea, vomiting, fever, chills, sweats, chest pain, chest pressure, diarrhea, constipation, dysphagia, dizziness, leg swelling, leg pain, itchy eyes, itchy ears, heartburn, reflux, or sour taste in the mouth.\par \par \par

## 2020-08-27 ENCOUNTER — APPOINTMENT (OUTPATIENT)
Dept: PULMONOLOGY | Facility: CLINIC | Age: 45
End: 2020-08-27
Payer: MEDICARE

## 2020-08-27 VITALS
DIASTOLIC BLOOD PRESSURE: 60 MMHG | RESPIRATION RATE: 17 BRPM | BODY MASS INDEX: 41.3 KG/M2 | HEART RATE: 79 BPM | SYSTOLIC BLOOD PRESSURE: 130 MMHG | WEIGHT: 295 LBS | TEMPERATURE: 97.8 F | HEIGHT: 71 IN

## 2020-08-27 DIAGNOSIS — Z01.812 ENCOUNTER FOR PREPROCEDURAL LABORATORY EXAMINATION: ICD-10-CM

## 2020-08-27 PROCEDURE — 99214 OFFICE O/P EST MOD 30 MIN: CPT | Mod: 25

## 2020-08-27 PROCEDURE — 99406 BEHAV CHNG SMOKING 3-10 MIN: CPT

## 2020-08-27 PROCEDURE — 95012 NITRIC OXIDE EXP GAS DETER: CPT

## 2020-08-27 NOTE — ADDENDUM
[FreeTextEntry1] : Documented by Giselle Sandra acting as a scribe for Dr. Trey Walton on 08/27/2020.\par \par All medical record entries made by the Scribe were at my, Dr. Trey Walton's, direction and personally dictated by me on 08/27/2020. I have reviewed the chart and agree that the record accurately reflects my personal performance of the history, physical exam, assessment and plan. I have also personally directed, reviewed, and agree with the discharge instructions. \par \par

## 2020-08-27 NOTE — ASSESSMENT
[FreeTextEntry1] : Mr. Call is a 44 year old male active smoker with a history of obesity, COPD/asthma, heart murmur, IMMANUEL, borderline DM, and HTN coming in for pulmonary re-evaluation for his shortness of breath as he is noncompliant. He is still trying to quit smoking. He is currently s/p acute bronchitis / asthmatic bronchitis 5/2020. (Non-complaint) - improved. / but depressed over COVID. \par \par His shortness of breath is felt to be multifactorial due to:\par -overweight\par -out of shape\par -poor breathing mechanics\par -COPD (rarely active)\par -asthma\par -? underlying cardiac disease (valvular heart disease)\par -sinus congestion\par \par problem 1: COPD/asthma (controlled)\par -continue Singulair 10 mg QHS\par -continue Incruse 1 inhalation QD \par -continue Breo 200 1 puff q day\par -continue Qvar 80 2 inhalations BID  \par -continue Proventil PRN and before exercise \par -Add Xopenex (0.3) TID via nebulizer \par \par -s/p blood work for + asthma profile, + food IgE panel, - eosinophil level, - IgE level, - Vitamin D level \par \par Compliance was highly stressed and discussed with the patient the importance of regular inhaler and medication use. \par \par -COPD is a progressive disease and although it can’t be cured , appropriate management can slow its progression, reduce frequency and severity of exacerbations, and improve symptoms and the patient quality of life. Hospitalizations are the greatest contributor to the total COPD costs and account for up to 87% of total COPD related costs. Exacerbations are the main cause of admissions and subsequently account for the 40-75% of COPD costs. Inhaled maintenance therapy reduces the incidence of exacerbations in patients with stable COPD. Incorrect inhaler use and nonadherence are major obstacles to achieving COPD treatment goals. Many COPD patients have challenges (impaired inhalation, limited dexterity, reduced cognition: that limit their ability to correctly use their COPD treatment devices resulting in reduced symptom control. Of most importance is smoking cessation and early intervention with respiratory illnesses and contemplation for pulmonary rehab to enhance quality of life.\par -Asthma is  believed to be caused by inherited (genetic) and environmental factor, but its exact cause is unknown. Asthma may be triggered by allergens, lung infections, or irritants in the air. Asthma triggers are different for each person\par -Inhaler technique reviewed as well as oral hygiene techniques reviewed with patient. Avoidance of cold air, extremes of temperature, rescue inhaler should be used before exercise. Order of medication reviewed with patient. Recommended use of a cool mist humidifier in the bedroom. \par \par problem 2: ? cardiac component\par -recommended cardiac evaluation secondary to history of hx of murmur, diabetes, HTN, smoking, and obesity \par \par problem 3: allergic rhinitis/sinusitis\par - add Clarinex 5 mg QAM\par -recommended the Sinugator for nasal rinsing\par -followed by Flonase 1 sniff/nostril BID - transition to Xhance 1 sniff BID\par -followed by Astelin 0.15 1 sniff/nostril BID \par \par problem 4: current every day smoker / nicotine addiction - (discussed: 8/27/2020) \par -recommended to taper down cigarettes \par -recommended to try Nicotrol patches\par -Discussed for five minutes with the patient the risks/associations with continued smoking including COPD, emphysema, shortness of breath, renal cancer, bladder cancer, stroke risk, cardiac disease, etc. Smoking cessation was discussed at length and highly encouraged. Various options to aid cessation was discussed including use of Chantix, Nicotrol, nicotine products, laser therapy, hypnosis, Wellbutrin, etc. \par \par problem 5: IMMANUEL\par -continue to use the CPAP machine, benefiting, and tolerating it well\par -recommended to use humidifier and Mouth Kote\par -Sleep apnea is associated with adverse clinical consequences which an affect most organ systems.  Cardiovascular disease risk includes arrhythmias, atrial fibrillation, hypertension, coronary artery disease, and stroke. Metabolic disorders include diabetes type 2, non-alcoholic fatty liver disease. Mood disorder especially depression; and cognitive decline especially in the elderly. Associations with  chronic reflux/Luna’s esophagus some but not all inclusive. \par -Reasons to assess this include arousal consistent with hypopnea; respiratory events most prominent in REM sleep or supine position; therefore sleep staging and body position are important for accurate diagnosis and estimation of AHI.\par -According to the National Heart, [employer] and Blood [place], CPAP or continuous positive airway pressure is a treatment that uses mild air pressure to keep breathing airways open. A CPAP machine includes a mask or other device that fits over the nose or nose and mouth. The mask is connected to a machine via the tube through which humidified air is blown. In the cases of obstructive sleep apnea, CPAP can reverse the complete blockages or narrowing of upper airways. Following diagnosis, CPAP machine pressures can be determined by a CPAP titration. Individuals who require CPAP can choose among masks and equipment that meet prescription and maximize comfort. Many become accustomed right away while others could require more time. Problems include uncomfortable masks or air leakage which can be adjusted to optimize compliance. \par \par problem 6: GERD\par -continue Omeprazole 40 mg before breakfast \par -Rule of 2s: avoid eating too much, eating too late, eating too spicy, eating two hours before bed\par -Things to avoid including overeating, spicy foods, tight clothing, eating within three hours of bed, this list is not all inclusive. \par -For treatment of reflux, possible options discussed including diet control, H2 blockers, PPIs, as well as coating motility agents discussed as treatment options. Timing of meals and proximity of last meal to sleep were discussed. If symptoms persist, a formal gastrointestinal evaluation is needed.\par \par problem 7: obesity \par -recommended regular exercise\par -mindful v mindless eating \par -Weight loss, exercise, and diet control were discussed and are highly encouraged. Treatment options were given such as, aqua therapy, and contacting a nutritionist. Recommended to use the elliptical, stationary bike, less use of treadmill.  Obesity is associated with worsening asthma, shortness of breath, and potential for cardiac disease, diabetes, and other underlying medical conditions.\par \par problem 8: poor breathing mechanics\par -Proper breathing techniques were reviewed with an emphasis of exhalation. Patient instructed to breath in for 1 second and out for four seconds. Patient was encouraged to not talk while walking. \par \par problem 9: r/o allergen profile- rediscussed \par -s/p blood work to include: - IgE level, - eosinophil level, - vitamin D level, + food IgE level, and + allergy panel(prescription re-given)\par \par problem 10: r/o chronic fatigue\par -aside from OSAS other etiologies include thyroid disease, anemia, low testosterone levels, and vitamin deficiencies as well as medication effects. Based on this recommended: CBC, thyroid function test, vitamin D levels, sleep study, and free and total testosterone levels. Dr. Walton went over topic multiple times and discussed for an extensive period of time. \par \par Problem 11: Health Maintenance/COVID19 Precautions:\par - Clean your hands often. Wash your hands often with soap and water for at least 20 seconds, especially after blowing your nose, coughing, or sneezing, or having been in a public place.\par - If soap and water are not available, use a hand  that contains at least 60% alcohol.\par - To the extent possible, avoid touching high-touch surfaces in public places - elevator buttons, door handles, handrails, handshaking with people, etc. Use a tissue or your sleeve to cover your hand or finger if you must touch something.\par - Wash your hands after touching surfaces in public places.\par - Avoid touching your face, nose, eyes, etc.\par - Clean and disinfect your home to remove germs: practice routine cleaning of frequently touched surfaces (for example: tables, doorknobs, light switches, handles, desks, toilets, faucets, sinks & cell phones)\par - Avoid crowds, especially in poorly ventilated spaces. Your risk of exposure to respiratory viruses like COVID-19 may increase in crowded, closed-in settings with little air circulation if\par there are people in the crowd who are sick. All patients are recommended to practice social distancing and stay at least 6 feet away from others.\par - Avoid all non-essential travel including plane trips, and especially avoid embarking on cruise ships.\par -If COVID-19 is spreading in your community, take extra measures to put distance between yourself and other people to further reduce your risk of being exposed to this new virus.\par -Stay home as much as possible.\par - Consider ways of getting food brought to your house through family, social, or commercial networks\par -Be aware that the virus has been known to live in the air up to 3 hours post exposure, cardboard up to 24 hours post exposure, copper up to 4 hours post exposure, steel and plastic up to 2-3 days post exposure. Risk of transmission from these surfaces are affected by many variables.\par \par Immune Support Recommendations:\par -OTC Vitamin C 500mg BID \par -OTC Quercetin 250-500mg BID \par -OTC Zinc 75-100mg per day \par -OTC Melatonin 1or 2mg a night \par -OTC Vitamin D 1-4000mg per day\par -Tonic Water 8oz\par -Recommended to Stay Hydrated (At least a gallon/day)\par \par Asthma and COVID19:\par You need to make sure your asthma is under control. This often requires the use of inhaled corticosteroids (and sometimes oral corticosteroids). Inhaled corticosteroids do not likely reduce your immune system’s ability to fight infections, but oral corticosteroids may. It is important to use the steps above to protect yourself to limit your exposure to any respiratory virus. \par \par problem 12: health maintenance \par -recommended to use Dr. Garcia's Intestinal Formula #1 \par -refused flu shot\par -recommended strep pneumonia vaccines: Prevnar-13 vaccine, followed by Pneumo vaccine 23 one year following\par -recommended early intervention for URIs\par -recommended regular osteoporosis evaluations\par -recommended early dermatological evaluations\par -recommended after the age of 50 to the age of 70, colonoscopy every 5 years \par \par Follow up in 4 months with spirometry and NiOx\par He is encouraged to call with any changes, concerns, or questions.

## 2020-08-27 NOTE — HISTORY OF PRESENT ILLNESS
[FreeTextEntry1] : Mr. Call is a 44 year old male presenting to the office for a follow up visit for asthma, allergic rhinitis, COPD, GERD, nicotine addiction, IMMANUEL, snoring, and shortness of breath.\par - he has been feeling depressed / stressed \par - his energy levels have been alright, hes about 8 1/2 - 9 out of 10 \par - he has not been sleeping fine\par - he injured his back, he feels he was maybe stressed and bent over sideways \par - he feels a bit of pressure sometimes\par - bowel movements are regular \par - no recent heart burn / reflux \par - he notes he has not been exercising much right now \par - denies any itchy eyes / ears \par - his weight has been stable \par - he denies taking any new medications, vitamins, or supplements. \par - since he has moved, he has been feeling stressed / depressed and his smoking habit has been back again. Right now he smokes about 3-5 cigarettes a day. \par - he notes he has been taking supplements \par - \par -he denies any headaches, nausea, vomiting, fever, chills, sweats, chest pain, chest pressure, diarrhea, constipation, dysphagia, dizziness, sour taste in the mouth, leg swelling, leg pain, itchy eyes, itchy ears.

## 2020-08-27 NOTE — PROCEDURE
[FreeTextEntry1] : FENO was 15; a normal value being less than 25\par Fractional exhaled nitric oxide (FENO) is regarded as a simple, noninvasive method for assessing eosinophilic airway inflammation. Produced by a variety of cells within the lung, nitric oxide (NO) concentrations are generally low in healthy individuals. However, high concentrations of NO appear to be involved in nonspecific host defense mechanisms and chronic inflammatory diseases such as asthma. The American Thoracic Society (ATS) therefore has recommended using FENO to aid in the diagnosis and monitoring of eosinophilic airway inflammation and asthma, and for identifying steroid responsive individuals whose chronic respiratory symptoms may be caused by airway inflammation.\par

## 2020-08-27 NOTE — PHYSICAL EXAM

## 2020-10-22 ENCOUNTER — RX RENEWAL (OUTPATIENT)
Age: 45
End: 2020-10-22

## 2020-10-22 RX ORDER — UMECLIDINIUM 62.5 UG/1
62.5 AEROSOL, POWDER ORAL
Qty: 3 | Refills: 1 | Status: ACTIVE | COMMUNITY
Start: 2020-04-27 | End: 1900-01-01

## 2020-10-29 PROBLEM — Z01.812 PRE-PROCEDURAL LABORATORY EXAMINATION: Status: ACTIVE | Noted: 2020-10-29

## 2020-12-06 ENCOUNTER — APPOINTMENT (OUTPATIENT)
Dept: DISASTER EMERGENCY | Facility: CLINIC | Age: 45
End: 2020-12-06

## 2020-12-06 DIAGNOSIS — Z01.818 ENCOUNTER FOR OTHER PREPROCEDURAL EXAMINATION: ICD-10-CM

## 2020-12-07 LAB — SARS-COV-2 N GENE NPH QL NAA+PROBE: NOT DETECTED

## 2020-12-11 ENCOUNTER — APPOINTMENT (OUTPATIENT)
Dept: PULMONOLOGY | Facility: CLINIC | Age: 45
End: 2020-12-11
Payer: MEDICARE

## 2020-12-11 ENCOUNTER — APPOINTMENT (OUTPATIENT)
Dept: PULMONOLOGY | Facility: CLINIC | Age: 45
End: 2020-12-11

## 2020-12-11 VITALS
DIASTOLIC BLOOD PRESSURE: 70 MMHG | HEART RATE: 78 BPM | SYSTOLIC BLOOD PRESSURE: 136 MMHG | TEMPERATURE: 97.3 F | BODY MASS INDEX: 43.81 KG/M2 | WEIGHT: 306 LBS | HEIGHT: 70 IN | OXYGEN SATURATION: 98 % | RESPIRATION RATE: 17 BRPM

## 2020-12-11 PROCEDURE — 99406 BEHAV CHNG SMOKING 3-10 MIN: CPT | Mod: 25

## 2020-12-11 PROCEDURE — 94729 DIFFUSING CAPACITY: CPT

## 2020-12-11 PROCEDURE — 95012 NITRIC OXIDE EXP GAS DETER: CPT

## 2020-12-11 PROCEDURE — 94726 PLETHYSMOGRAPHY LUNG VOLUMES: CPT

## 2020-12-11 PROCEDURE — 94618 PULMONARY STRESS TESTING: CPT

## 2020-12-11 PROCEDURE — 99072 ADDL SUPL MATRL&STAF TM PHE: CPT

## 2020-12-11 PROCEDURE — 99214 OFFICE O/P EST MOD 30 MIN: CPT | Mod: 25

## 2020-12-11 PROCEDURE — 94010 BREATHING CAPACITY TEST: CPT

## 2020-12-11 RX ORDER — DOXYCYCLINE HYCLATE 100 MG/1
100 CAPSULE ORAL
Qty: 20 | Refills: 0 | Status: ACTIVE | COMMUNITY
Start: 2020-12-11 | End: 1900-01-01

## 2020-12-11 RX ORDER — FLUTICASONE FUROATE, UMECLIDINIUM BROMIDE AND VILANTEROL TRIFENATATE 200; 62.5; 25 UG/1; UG/1; UG/1
200-62.5-25 POWDER RESPIRATORY (INHALATION)
Qty: 3 | Refills: 1 | Status: ACTIVE | COMMUNITY
Start: 2020-12-11 | End: 1900-01-01

## 2020-12-11 NOTE — PROCEDURE
[FreeTextEntry1] : Full PFT revealed mild obstructive dysfunction at mid to low lung volumes, with a FEV1 of 3.98L, which is 92% of predicted, normal lung volumes, and a diffusion of 38.8, which is 119% of predicted, with a normal flow volume loop.\par \par FENO was 5; a normal value being less than 25\par Fractional exhaled nitric oxide (FENO) is regarded as a simple, noninvasive method for assessing eosinophilic airway inflammation. Produced by a variety of cells within the lung, nitric oxide (NO) concentrations are generally low in healthy individuals. However, high concentrations of NO appear to be involved in nonspecific host defense mechanisms and chronic inflammatory diseases such as asthma. The American Thoracic Society (ATS) therefore has recommended using FENO to aid in the diagnosis and monitoring of eosinophilic airway inflammation and asthma, and for identifying steroid responsive individuals whose chronic respiratory symptoms may be caused by airway inflammation.

## 2020-12-11 NOTE — ADDENDUM
[FreeTextEntry1] : Documented by Percy West acting as a scribe for Dr. Trey Walton on 12/11/2020 \par \par All medical record entries made by the Scribe were at my, Dr. Trey Walton's, direction and personally dictated by me on 12/11/2020 . I have reviewed the chart and agree that the record accurately reflects my personal performance of the history, physical exam, assessment and plan. I have also personally directed, reviewed, and agree with the discharge instructions.

## 2020-12-11 NOTE — PHYSICAL EXAM

## 2020-12-11 NOTE — HISTORY OF PRESENT ILLNESS
[FreeTextEntry1] : Mr. Call is a 45 year old male presenting to the office for a follow up visit for asthma, allergic rhinitis, COPD, GERD, nicotine addiction, IMMANUEL, snoring, and shortness of breath. His chief complaint is keeping up with his health. \par -he has been producing phlegm. \par -he reports some upper respiratory is congested. \par -he is still smoking. \par -he notes that His bowels are regular. \par -denies palpitations. \par -has been sleeping 6 hours. \par -he reports that he is unsure if 6 hours are enough for him. \par -he states that his weight has been a little up.\par -he gained about 14 pounds. \par -he states that he had so much to eat recently. \par -he states that he is not exercising. \par -he has not been walking. \par -he reports feeling pressure in the bottom of his chest every time he works out. \par -he states that he will be in [place] for one month. \par -He reports that He is not using any new medication, vitamins, or supplements. \par -has been using the CPAP machine every night and has been benefiting from its use. \par -he states that his CPAP is broken beyond repair.\par -has been using the Breo. \par \par -He denies any diarrhea, constipation, dysphagia, dizziness, sour taste in the mouth, leg swelling, leg pain, itchy eyes, itchy ears, heartburn, reflux, myalgias or arthralgias.

## 2020-12-11 NOTE — ASSESSMENT
[FreeTextEntry1] : Mr. Call is a 45 year old male active smoker with a history of obesity, COPD/asthma, heart murmur, IMMANUEL, borderline DM, and HTN coming in for pulmonary re-evaluation for his shortness of breath as he is noncompliant. He is still trying to quit smoking. He is currently s/p acute bronchitis / asthmatic bronchitis 5/2020. (Non-complaint) - improved. / but depressed over COVID - ? Bronchitis \par \par His shortness of breath is felt to be multifactorial due to:\par -overweight\par -out of shape\par -poor breathing mechanics\par -COPD (rarely active)\par -asthma\par -? underlying cardiac disease (valvular heart disease)\par -sinus congestion\par \par Problem 1A: COPD Flair \par Doxy 100 BID (10 days) \par \par problem 1: COPD/asthma (controlled)\par -continue Singulair 10 mg QHS\par -discontinue Incruse 1 inhalation QD \par -discontinue Breo 200 1 puff q day\par -Add Trelegy (200) 1 inhalation QD \par -continue Qvar 80 2 inhalations BID  \par -continue Proventil PRN and before exercise \par -Add Xopenex (0.3) TID via nebulizer \par \par -s/p blood work for + asthma profile, + food IgE panel, - eosinophil level, - IgE level, - Vitamin D level \par \par Compliance was highly stressed and discussed with the patient the importance of regular inhaler and medication use. \par \par -COPD is a progressive disease and although it can’t be cured , appropriate management can slow its progression, reduce frequency and severity of exacerbations, and improve symptoms and the patient quality of life. Hospitalizations are the greatest contributor to the total COPD costs and account for up to 87% of total COPD related costs. Exacerbations are the main cause of admissions and subsequently account for the 40-75% of COPD costs. Inhaled maintenance therapy reduces the incidence of exacerbations in patients with stable COPD. Incorrect inhaler use and nonadherence are major obstacles to achieving COPD treatment goals. Many COPD patients have challenges (impaired inhalation, limited dexterity, reduced cognition: that limit their ability to correctly use their COPD treatment devices resulting in reduced symptom control. Of most importance is smoking cessation and early intervention with respiratory illnesses and contemplation for pulmonary rehab to enhance quality of life.\par -Asthma is  believed to be caused by inherited (genetic) and environmental factor, but its exact cause is unknown. Asthma may be triggered by allergens, lung infections, or irritants in the air. Asthma triggers are different for each person\par -Inhaler technique reviewed as well as oral hygiene techniques reviewed with patient. Avoidance of cold air, extremes of temperature, rescue inhaler should be used before exercise. Order of medication reviewed with patient. Recommended use of a cool mist humidifier in the bedroom. \par \par problem 2: ? cardiac component\par -recommended cardiac evaluation secondary to history of hx of murmur, diabetes, HTN, smoking, and obesity \par \par problem 3: allergic rhinitis/sinusitis\par - add Clarinex 5 mg QAM\par -recommended the Sinugator for nasal rinsing\par -followed by Flonase 1 sniff/nostril BID - transition to Xhance 1 sniff BID\par -followed by Astelin 0.15 1 sniff/nostril BID \par \par problem 4: current every day smoker / nicotine addiction - (discussed: 12/11/2020) \par -recommended to taper down cigarettes \par -recommended to try Nicotrol patches\par -Discussed for five minutes with the patient the risks/associations with continued smoking including COPD, emphysema, shortness of breath, renal cancer, bladder cancer, stroke risk, cardiac disease, etc. Smoking cessation was discussed at length and highly encouraged. Various options to aid cessation was discussed including use of Chantix, Nicotrol, nicotine products, laser therapy, hypnosis, Wellbutrin, etc. \par \par problem 5: IMMANUEL ( CPAP Broken beyond repair )\par -continue to use the CPAP machine, benefiting, and tolerating it well - CPAP Broken beyond repair \par -recommended to use humidifier and Mouth Kote\par -Sleep apnea is associated with adverse clinical consequences which an affect most organ systems.  Cardiovascular disease risk includes arrhythmias, atrial fibrillation, hypertension, coronary artery disease, and stroke. Metabolic disorders include diabetes type 2, non-alcoholic fatty liver disease. Mood disorder especially depression; and cognitive decline especially in the elderly. Associations with  chronic reflux/Luna’s esophagus some but not all inclusive. \par -Reasons to assess this include arousal consistent with hypopnea; respiratory events most prominent in REM sleep or supine position; therefore sleep staging and body position are important for accurate diagnosis and estimation of AHI.\par -According to the National Heart, Lung and Blood [place], CPAP or continuous positive airway pressure is a treatment that uses mild air pressure to keep breathing airways open. A CPAP machine includes a mask or other device that fits over the nose or nose and mouth. The mask is connected to a machine via the tube through which humidified air is blown. In the cases of obstructive sleep apnea, CPAP can reverse the complete blockages or narrowing of upper airways. Following diagnosis, CPAP machine pressures can be determined by a CPAP titration. Individuals who require CPAP can choose among masks and equipment that meet prescription and maximize comfort. Many become accustomed right away while others could require more time. Problems include uncomfortable masks or air leakage which can be adjusted to optimize compliance. \par \par problem 6: GERD\par -continue Omeprazole 40 mg before breakfast \par -Rule of 2s: avoid eating too much, eating too late, eating too spicy, eating two hours before bed\par -Things to avoid including overeating, spicy foods, tight clothing, eating within three hours of bed, this list is not all inclusive. \par -For treatment of reflux, possible options discussed including diet control, H2 blockers, PPIs, as well as coating motility agents discussed as treatment options. Timing of meals and proximity of last meal to sleep were discussed. If symptoms persist, a formal gastrointestinal evaluation is needed.\par \par problem 7: obesity \par -recommended regular exercise\par -mindful v mindless eating \par -Weight loss, exercise, and diet control were discussed and are highly encouraged. Treatment options were given such as, aqua therapy, and contacting a nutritionist. Recommended to use the elliptical, stationary bike, less use of treadmill.  Obesity is associated with worsening asthma, shortness of breath, and potential for cardiac disease, diabetes, and other underlying medical conditions.\par \par problem 8: poor breathing mechanics\par -Proper breathing techniques were reviewed with an emphasis of exhalation. Patient instructed to breath in for 1 second and out for four seconds. Patient was encouraged to not talk while walking. \par \par problem 9: r/o allergen profile- rediscussed \par -s/p blood work to include: - IgE level, - eosinophil level, - vitamin D level, + food IgE level, and + allergy panel(prescription re-given)\par \par problem 10: r/o chronic fatigue\par -aside from OSAS other etiologies include thyroid disease, anemia, low testosterone levels, and vitamin deficiencies as well as medication effects. Based on this recommended: CBC, thyroid function test, vitamin D levels, sleep study, and free and total testosterone levels. Dr. Walton went over topic multiple times and discussed for an extensive period of time. \par \par Problem 11: Health Maintenance/COVID19 Precautions:\par - Clean your hands often. Wash your hands often with soap and water for at least 20 seconds, especially after blowing your nose, coughing, or sneezing, or having been in a public place.\par - If soap and water are not available, use a hand  that contains at least 60% alcohol.\par - To the extent possible, avoid touching high-touch surfaces in public places - elevator buttons, door handles, handrails, handshaking with people, etc. Use a tissue or your sleeve to cover your hand or finger if you must touch something.\par - Wash your hands after touching surfaces in public places.\par - Avoid touching your face, nose, eyes, etc.\par - Clean and disinfect your home to remove germs: practice routine cleaning of frequently touched surfaces (for example: tables, doorknobs, light switches, handles, desks, toilets, faucets, sinks & cell phones)\par - Avoid crowds, especially in poorly ventilated spaces. Your risk of exposure to respiratory viruses like COVID-19 may increase in crowded, closed-in settings with little air circulation if\par there are people in the crowd who are sick. All patients are recommended to practice social distancing and stay at least 6 feet away from others.\par - Avoid all non-essential travel including plane trips, and especially avoid embarking on cruise ships.\par -If COVID-19 is spreading in your community, take extra measures to put distance between yourself and other people to further reduce your risk of being exposed to this new virus.\par -Stay home as much as possible.\par - Consider ways of getting food brought to your house through family, social, or commercial networks\par -Be aware that the virus has been known to live in the air up to 3 hours post exposure, cardboard up to 24 hours post exposure, copper up to 4 hours post exposure, steel and plastic up to 2-3 days post exposure. Risk of transmission from these surfaces are affected by many variables.\par \par Immune Support Recommendations:\par -OTC Vitamin C 500mg BID \par -OTC Quercetin 250-500mg BID \par -OTC Zinc 75-100mg per day \par -OTC Melatonin 1or 2mg a night \par -OTC Vitamin D 1-4000mg per day\par -Tonic Water 8oz\par -Recommended to Stay Hydrated (At least a gallon/day)\par \par Asthma and COVID19:\par You need to make sure your asthma is under control. This often requires the use of inhaled corticosteroids (and sometimes oral corticosteroids). Inhaled corticosteroids do not likely reduce your immune system’s ability to fight infections, but oral corticosteroids may. It is important to use the steps above to protect yourself to limit your exposure to any respiratory virus. \par \par problem 12: health maintenance \par -recommended to use Dr. Garcia's Intestinal Formula #1 \par -refused flu shot\par -recommended strep pneumonia vaccines: Prevnar-13 vaccine, followed by Pneumo vaccine 23 one year following\par -recommended early intervention for URIs\par -recommended regular osteoporosis evaluations\par -recommended early dermatological evaluations\par -recommended after the age of 50 to the age of 70, colonoscopy every 5 years \par \par Follow up in 4 months with spirometry and NiOx\par He is encouraged to call with any changes, concerns, or questions.

## 2020-12-22 ENCOUNTER — RX RENEWAL (OUTPATIENT)
Age: 45
End: 2020-12-22

## 2021-04-09 ENCOUNTER — APPOINTMENT (OUTPATIENT)
Dept: PULMONOLOGY | Facility: CLINIC | Age: 46
End: 2021-04-09
Payer: MEDICARE

## 2021-04-09 VITALS
HEIGHT: 70 IN | SYSTOLIC BLOOD PRESSURE: 150 MMHG | OXYGEN SATURATION: 98 % | TEMPERATURE: 97.9 F | BODY MASS INDEX: 45.1 KG/M2 | HEART RATE: 75 BPM | WEIGHT: 315 LBS | RESPIRATION RATE: 17 BRPM | DIASTOLIC BLOOD PRESSURE: 82 MMHG

## 2021-04-09 DIAGNOSIS — Z87.09 PERSONAL HISTORY OF OTHER DISEASES OF THE RESPIRATORY SYSTEM: ICD-10-CM

## 2021-04-09 PROCEDURE — 99072 ADDL SUPL MATRL&STAF TM PHE: CPT

## 2021-04-09 PROCEDURE — 99214 OFFICE O/P EST MOD 30 MIN: CPT | Mod: 25

## 2021-04-09 PROCEDURE — 99406 BEHAV CHNG SMOKING 3-10 MIN: CPT

## 2021-04-09 RX ORDER — TIOTROPIUM BROMIDE INHALATION SPRAY 3.12 UG/1
2.5 SPRAY, METERED RESPIRATORY (INHALATION) DAILY
Qty: 3 | Refills: 1 | Status: ACTIVE | COMMUNITY
Start: 2021-04-09 | End: 1900-01-01

## 2021-04-09 RX ORDER — FLUTICASONE PROPIONATE 93 UG/1
93 SPRAY, METERED NASAL
Qty: 3 | Refills: 1 | Status: ACTIVE | COMMUNITY
Start: 2021-04-09 | End: 1900-01-01

## 2021-04-09 NOTE — ASSESSMENT
[FreeTextEntry1] : Mr. Call is a 45 year old male active smoker with a history of obesity, COPD/asthma, heart murmur, IMMANUEL, borderline DM, and HTN coming in for pulmonary re-evaluation for his shortness of breath as he is noncompliant. He is still trying to quit smoking. He is currently s/p acute bronchitis / asthmatic bronchitis 5/2020. (Non-complaint) - improved. / but depressed over COVID - active asthma\par \par His shortness of breath is felt to be multifactorial due to:\par -overweight\par -out of shape\par -poor breathing mechanics\par -COPD (rarely active)\par -asthma\par -? underlying cardiac disease (valvular heart disease)\par -sinus congestion\par \par \par problem 1: COPD/asthma (controlled)\par -continue Singulair 10 mg QHS\par  Add Spiriva respimat  2 qDay \par -continue Qvar 80 2 inhalations BID  \par -continue Proventil PRN and before exercise \par -Add Xopenex (0.3) TID via nebulizer \par \par -s/p blood work for + asthma profile, + food IgE panel, - eosinophil level, - IgE level, - Vitamin D level \par \par Compliance was highly stressed and discussed with the patient the importance of regular inhaler and medication use. \par \par -COPD is a progressive disease and although it can’t be cured , appropriate management can slow its progression, reduce frequency and severity of exacerbations, and improve symptoms and the patient quality of life. Hospitalizations are the greatest contributor to the total COPD costs and account for up to 87% of total COPD related costs. Exacerbations are the main cause of admissions and subsequently account for the 40-75% of COPD costs. Inhaled maintenance therapy reduces the incidence of exacerbations in patients with stable COPD. Incorrect inhaler use and nonadherence are major obstacles to achieving COPD treatment goals. Many COPD patients have challenges (impaired inhalation, limited dexterity, reduced cognition: that limit their ability to correctly use their COPD treatment devices resulting in reduced symptom control. Of most importance is smoking cessation and early intervention with respiratory illnesses and contemplation for pulmonary rehab to enhance quality of life.\par -Asthma is  believed to be caused by inherited (genetic) and environmental factor, but its exact cause is unknown. Asthma may be triggered by allergens, lung infections, or irritants in the air. Asthma triggers are different for each person\par -Inhaler technique reviewed as well as oral hygiene techniques reviewed with patient. Avoidance of cold air, extremes of temperature, rescue inhaler should be used before exercise. Order of medication reviewed with patient. Recommended use of a cool mist humidifier in the bedroom. \par \par problem 2: ? cardiac component\par -recommended cardiac evaluation secondary to history of hx of murmur, diabetes, HTN, smoking, and obesity \par \par problem 3: allergic rhinitis/sinusitis\par - add Clarinex 5 mg QAM\par -recommended the Sinugator for nasal rinsing\par -followed by Flonase 1 sniff/nostril BID - transition to Xhance 1 sniff BID\par -followed by Astelin 0.15 1 sniff/nostril BID \par \par problem 4: current every day smoker / nicotine addiction - (discussed: 4/9/2021) \par -recommended to taper down cigarettes \par -recommended to try Nicotrol patches\par -Discussed for five minutes with the patient the risks/associations with continued smoking including COPD, emphysema, shortness of breath, renal cancer, bladder cancer, stroke risk, cardiac disease, etc. Smoking cessation was discussed at length and highly encouraged. Various options to aid cessation was discussed including use of Chantix, Nicotrol, nicotine products, laser therapy, hypnosis, Wellbutrin, etc. \par \par problem 5: IMMANUEL ( CPAP Broken beyond repair )\par -continue to use the CPAP machine, benefiting, and tolerating it well - CPAP Broken beyond repair \par -recommended to use humidifier and Mouth Kote\par -Sleep apnea is associated with adverse clinical consequences which an affect most organ systems.  Cardiovascular disease risk includes arrhythmias, atrial fibrillation, hypertension, coronary artery disease, and stroke. Metabolic disorders include diabetes type 2, non-alcoholic fatty liver disease. Mood disorder especially depression; and cognitive decline especially in the elderly. Associations with  chronic reflux/Luna’s esophagus some but not all inclusive. \par -Reasons to assess this include arousal consistent with hypopnea; respiratory events most prominent in REM sleep or supine position; therefore sleep staging and body position are important for accurate diagnosis and estimation of AHI.\par -According to the National Heart, Lung and Blood [place], CPAP or continuous positive airway pressure is a treatment that uses mild air pressure to keep breathing airways open. A CPAP machine includes a mask or other device that fits over the nose or nose and mouth. The mask is connected to a machine via the tube through which humidified air is blown. In the cases of obstructive sleep apnea, CPAP can reverse the complete blockages or narrowing of upper airways. Following diagnosis, CPAP machine pressures can be determined by a CPAP titration. Individuals who require CPAP can choose among masks and equipment that meet prescription and maximize comfort. Many become accustomed right away while others could require more time. Problems include uncomfortable masks or air leakage which can be adjusted to optimize compliance. \par \par problem 6: GERD\par -continue Omeprazole 40 mg before breakfast \par -Rule of 2s: avoid eating too much, eating too late, eating too spicy, eating two hours before bed\par -Things to avoid including overeating, spicy foods, tight clothing, eating within three hours of bed, this list is not all inclusive. \par -For treatment of reflux, possible options discussed including diet control, H2 blockers, PPIs, as well as coating motility agents discussed as treatment options. Timing of meals and proximity of last meal to sleep were discussed. If symptoms persist, a formal gastrointestinal evaluation is needed.\par \par problem 7: obesity \par -recommended regular exercise\par -mindful v mindless eating \par -Weight loss, exercise, and diet control were discussed and are highly encouraged. Treatment options were given such as, aqua therapy, and contacting a nutritionist. Recommended to use the elliptical, stationary bike, less use of treadmill.  Obesity is associated with worsening asthma, shortness of breath, and potential for cardiac disease, diabetes, and other underlying medical conditions.\par \par problem 8: poor breathing mechanics\par -Proper breathing techniques were reviewed with an emphasis of exhalation. Patient instructed to breath in for 1 second and out for four seconds. Patient was encouraged to not talk while walking. \par \par problem 9: r/o allergen profile- rediscussed \par -s/p blood work to include: - IgE level, - eosinophil level, - vitamin D level, + food IgE level, and + allergy panel(prescription re-given)\par \par problem 10: r/o chronic fatigue\par -aside from OSAS other etiologies include thyroid disease, anemia, low testosterone levels, and vitamin deficiencies as well as medication effects. Based on this recommended: CBC, thyroid function test, vitamin D levels, sleep study, and free and total testosterone levels. Dr. Walton went over topic multiple times and discussed for an extensive period of time. \par \par Problem 11: Health Maintenance/COVID19 Precautions:\par -JnJ COVID-19 vaccine recommended\par -COVID-19 vaccine discussed with patient\par - Clean your hands often. Wash your hands often with soap and water for at least 20 seconds, especially after blowing your nose, coughing, or sneezing, or having been in a public place.\par - If soap and water are not available, use a hand  that contains at least 60% alcohol.\par - To the extent possible, avoid touching high-touch surfaces in public places - elevator buttons, door handles, handrails, handshaking with people, etc. Use a tissue or your sleeve to cover your hand or finger if you must touch something.\par - Wash your hands after touching surfaces in public places.\par - Avoid touching your face, nose, eyes, etc.\par - Clean and disinfect your home to remove germs: practice routine cleaning of frequently touched surfaces (for example: tables, doorknobs, light switches, handles, desks, toilets, faucets, sinks & cell phones)\par - Avoid crowds, especially in poorly ventilated spaces. Your risk of exposure to respiratory viruses like COVID-19 may increase in crowded, closed-in settings with little air circulation if\par there are people in the crowd who are sick. All patients are recommended to practice social distancing and stay at least 6 feet away from others.\par - Avoid all non-essential travel including plane trips, and especially avoid embarking on cruise ships.\par -If COVID-19 is spreading in your community, take extra measures to put distance between yourself and other people to further reduce your risk of being exposed to this new virus.\par -Stay home as much as possible.\par - Consider ways of getting food brought to your house through family, social, or commercial networks\par -Be aware that the virus has been known to live in the air up to 3 hours post exposure, cardboard up to 24 hours post exposure, copper up to 4 hours post exposure, steel and plastic up to 2-3 days post exposure. Risk of transmission from these surfaces are affected by many variables.\par \par Immune Support Recommendations:\par -OTC Vitamin C 500mg BID \par -OTC Quercetin 250-500mg BID \par -OTC Zinc 75-100mg per day \par -OTC Melatonin 1or 2mg a night \par -OTC Vitamin D 1-4000mg per day\par -Tonic Water 8oz\par -Recommended to Stay Hydrated (At least a gallon/day)\par \par Asthma and COVID19:\par You need to make sure your asthma is under control. This often requires the use of inhaled corticosteroids (and sometimes oral corticosteroids). Inhaled corticosteroids do not likely reduce your immune system’s ability to fight infections, but oral corticosteroids may. It is important to use the steps above to protect yourself to limit your exposure to any respiratory virus. \par \par problem 12: health maintenance \par -recommended to use Dr. Garcia's Intestinal Formula #1 \par -refused flu shot\par -recommended strep pneumonia vaccines: Prevnar-13 vaccine, followed by Pneumo vaccine 23 one year following\par -recommended early intervention for URIs\par -recommended regular osteoporosis evaluations\par -recommended early dermatological evaluations\par -recommended after the age of 50 to the age of 70, colonoscopy every 5 years \par \par Follow up in 4 months with spirometry and NiOx\par He is encouraged to call with any changes, concerns, or questions.

## 2021-04-09 NOTE — HISTORY OF PRESENT ILLNESS
[FreeTextEntry1] : Mr. Call is a 45 year old male presenting to the office for a follow up visit for asthma, allergic rhinitis, COPD, GERD, nicotine addiction, IMMANUEL, snoring, and shortness of breath. His chief complaint is keeping up with his health. \par -he notes chest pain\par -he notes taking Breo again\par -he notes visual issues (needs glasses) \par -he notes sleeping 5-6 hours per night\par -he notes walking for exercise\par -he notes that when he walks and stops he starts to wheeze\par -he notes he is trying not to smoke as much as he used to\par -he notes smoking 4-6 cigarettes a day\par -he notes gaining weight after recent visit to [place]\par -he notes constipation for 2 weeks when he was in [place]\par -he notes constipation improved with Metamucil\par -he notes sinuses are active\par -he notes he does not trust nor intend to get the COVID-19 vaccine\par \par \par patient denies any headaches, nausea, vomiting, fever, chills, sweats, chest pain, chest pressure, palpitations, coughing, wheezing, fatigue, diarrhea, constipation, dysphagia, myalgias, dizziness, leg swelling, leg pain, itchy eyes, itchy ears, heartburn, reflux or sour taste in the mouth

## 2021-04-09 NOTE — ADDENDUM
[FreeTextEntry1] : Documented by Leoncio Gomes acting as a scribe for Dr. Trey Walton on (04/09/2021).\par \par All medical record entries made by the Scribe were at my, Dr. Tery Walton's, direction and personally dictated by me on (04/09/2021). I have reviewed the chart and agree that the record accurately reflects my personal performance of the history, physical exam, assessment and plan. I have also personally directed, reviewed, and agree with the discharge instructions.\par

## 2021-08-10 ENCOUNTER — APPOINTMENT (OUTPATIENT)
Dept: PULMONOLOGY | Facility: CLINIC | Age: 46
End: 2021-08-10
Payer: MEDICARE

## 2021-08-10 VITALS
BODY MASS INDEX: 43.81 KG/M2 | HEART RATE: 75 BPM | SYSTOLIC BLOOD PRESSURE: 144 MMHG | WEIGHT: 306 LBS | TEMPERATURE: 97.3 F | DIASTOLIC BLOOD PRESSURE: 80 MMHG | HEIGHT: 70 IN | RESPIRATION RATE: 16 BRPM | OXYGEN SATURATION: 97 %

## 2021-08-10 PROCEDURE — 99406 BEHAV CHNG SMOKING 3-10 MIN: CPT

## 2021-08-10 PROCEDURE — 99214 OFFICE O/P EST MOD 30 MIN: CPT | Mod: 25

## 2021-08-10 NOTE — HISTORY OF PRESENT ILLNESS
[FreeTextEntry1] : Mr. Call is a 45 year old male presenting to the office for a follow up visit for asthma, allergic rhinitis, COPD, GERD, nicotine addiction, IMMANUEL, snoring, and shortness of breath. His chief complaint is\par - he is vaccine hesitant \par - he has been feeling some tightness in the chest, although he takes his COPD inhalers. \par -  He  notes bowels are regular, he takes Activia once a day \par - no voice hoarseness \par - he has not been exercising, he wants to go swimming but he can't do it much due to COVID restrictions. \par - he notes he has a little bit of a cough at times, along with some wheezing\par - sense of taste is good but his sense of smell comes and goes \par - he notes nasal sprays don’t work for him well\par - his sinuses are a bit congested\par - he notes he has been getting acid reflux lately. Had an episode 2 nights ago. \par - no wheezing \par - no additional SOB \par - he notes due to his cousin passing away, he got depressed. He started to smoke again. \par - he has been trying to quit smoking again. \par - he still has his old CPAP machine, but he had the parts replaced (hose, mask)\par - He  denies any visual issues, headaches, nausea, vomiting, fever, chills, sweats, diarrhea, constipation, dysphagia, myalgia, dizziness, leg swelling, leg pain, itchy eyes, itchy ears.

## 2021-08-10 NOTE — ASSESSMENT
[FreeTextEntry1] : Mr. Call is a 45 year old male active smoker with a history of obesity, COPD/asthma, heart murmur, IMMANUEL, borderline DM, and HTN coming in for pulmonary re-evaluation for his shortness of breath as he is noncompliant. He is still trying to quit smoking. He is currently s/p acute bronchitis / asthmatic bronchitis 5/2020. (Non-complaint) - improved. / but depressed over COVID - family death - smoking again \par \par His shortness of breath is felt to be multifactorial due to:\par -overweight\par -out of shape\par -poor breathing mechanics\par -COPD (rarely active)\par -asthma\par -? underlying cardiac disease (valvular heart disease)\par -sinus congestion\par \par \par problem 1: COPD/asthma (controlled)\par -continue Singulair 10 mg QHS\par  Add Spiriva respimat  2 qDay \par -continue Qvar 80 2 inhalations BID  \par -continue Proventil PRN and before exercise \par -Add Xopenex (0.3) TID via nebulizer \par \par -s/p blood work for + asthma profile, + food IgE panel, - eosinophil level, - IgE level, - Vitamin D level \par \par Compliance was highly stressed and discussed with the patient the importance of regular inhaler and medication use. \par \par -COPD is a progressive disease and although it can’t be cured , appropriate management can slow its progression, reduce frequency and severity of exacerbations, and improve symptoms and the patient quality of life. Hospitalizations are the greatest contributor to the total COPD costs and account for up to 87% of total COPD related costs. Exacerbations are the main cause of admissions and subsequently account for the 40-75% of COPD costs. Inhaled maintenance therapy reduces the incidence of exacerbations in patients with stable COPD. Incorrect inhaler use and nonadherence are major obstacles to achieving COPD treatment goals. Many COPD patients have challenges (impaired inhalation, limited dexterity, reduced cognition: that limit their ability to correctly use their COPD treatment devices resulting in reduced symptom control. Of most importance is smoking cessation and early intervention with respiratory illnesses and contemplation for pulmonary rehab to enhance quality of life.\par -Asthma is  believed to be caused by inherited (genetic) and environmental factor, but its exact cause is unknown. Asthma may be triggered by allergens, lung infections, or irritants in the air. Asthma triggers are different for each person\par -Inhaler technique reviewed as well as oral hygiene techniques reviewed with patient. Avoidance of cold air, extremes of temperature, rescue inhaler should be used before exercise. Order of medication reviewed with patient. Recommended use of a cool mist humidifier in the bedroom. \par \par problem 2: ? cardiac component\par -recommended cardiac evaluation secondary to history of hx of murmur, diabetes, HTN, smoking, and obesity \par \par problem 3: allergic rhinitis/sinusitis\par - continue Clarinex 5 mg QAM\par -recommended the Sinugator for nasal rinsing\par -followed by Flonase 1 sniff/nostril BID - transition to Xhance 1 sniff BID\par -followed by Astelin 0.15 1 sniff/nostril BID \par \par problem 4: current every day smoker / nicotine addiction - (discussed: 8/10/2021) \par -recommended to taper down cigarettes \par -recommended to try Nicotrol patches\par -Discussed for five minutes with the patient the risks/associations with continued smoking including COPD, emphysema, shortness of breath, renal cancer, bladder cancer, stroke risk, cardiac disease, etc. Smoking cessation was discussed at length and highly encouraged. Various options to aid cessation was discussed including use of Chantix, Nicotrol, nicotine products, laser therapy, hypnosis, Wellbutrin, etc. \par \par problem 5: IMMANUEL (complaint) \par -continue to use the CPAP machine, benefiting, and tolerating it well. \par -recommended to use humidifier and Mouth Kote\par -Sleep apnea is associated with adverse clinical consequences which an affect most organ systems.  Cardiovascular disease risk includes arrhythmias, atrial fibrillation, hypertension, coronary artery disease, and stroke. Metabolic disorders include diabetes type 2, non-alcoholic fatty liver disease. Mood disorder especially depression; and cognitive decline especially in the elderly. Associations with  chronic reflux/Luna’s esophagus some but not all inclusive. \par -Reasons to assess this include arousal consistent with hypopnea; respiratory events most prominent in REM sleep or supine position; therefore sleep staging and body position are important for accurate diagnosis and estimation of AHI.\par -According to the National Heart, Lung and Blood [place], CPAP or continuous positive airway pressure is a treatment that uses mild air pressure to keep breathing airways open. A CPAP machine includes a mask or other device that fits over the nose or nose and mouth. The mask is connected to a machine via the tube through which humidified air is blown. In the cases of obstructive sleep apnea, CPAP can reverse the complete blockages or narrowing of upper airways. Following diagnosis, CPAP machine pressures can be determined by a CPAP titration. Individuals who require CPAP can choose among masks and equipment that meet prescription and maximize comfort. Many become accustomed right away while others could require more time. Problems include uncomfortable masks or air leakage which can be adjusted to optimize compliance. \par \par problem 6: GERD\par -continue Omeprazole 40 mg before breakfast \par -Rule of 2s: avoid eating too much, eating too late, eating too spicy, eating two hours before bed\par -Things to avoid including overeating, spicy foods, tight clothing, eating within three hours of bed, this list is not all inclusive. \par -For treatment of reflux, possible options discussed including diet control, H2 blockers, PPIs, as well as coating motility agents discussed as treatment options. Timing of meals and proximity of last meal to sleep were discussed. If symptoms persist, a formal gastrointestinal evaluation is needed.\par \par problem 7: obesity \par -recommended regular exercise\par -mindful v mindless eating \par -Weight loss, exercise, and diet control were discussed and are highly encouraged. Treatment options were given such as, aqua therapy, and contacting a nutritionist. Recommended to use the elliptical, stationary bike, less use of treadmill.  Obesity is associated with worsening asthma, shortness of breath, and potential for cardiac disease, diabetes, and other underlying medical conditions.\par \par problem 8: poor breathing mechanics\par -Proper breathing techniques were reviewed with an emphasis of exhalation. Patient instructed to breath in for 1 second and out for four seconds. Patient was encouraged to not talk while walking. \par \par problem 9: r/o allergen profile- rediscussed \par -s/p blood work to include: - IgE level, - eosinophil level, - vitamin D level, + food IgE level, and + allergy panel(prescription re-given)\par \par problem 10: r/o chronic fatigue\par -aside from OSAS other etiologies include thyroid disease, anemia, low testosterone levels, and vitamin deficiencies as well as medication effects. Based on this recommended: CBC, thyroid function test, vitamin D levels, sleep study, and free and total testosterone levels. Dr. Walton went over topic multiple times and discussed for an extensive period of time. \par \par Problem 11: Health Maintenance/COVID19 Precautions: Vaccine hesitant \par -  COVID-19 vaccine recommended\par -COVID-19 vaccine discussed with patient\par - Clean your hands often. Wash your hands often with soap and water for at least 20 seconds, especially after blowing your nose, coughing, or sneezing, or having been in a public place.\par - If soap and water are not available, use a hand  that contains at least 60% alcohol.\par - To the extent possible, avoid touching high-touch surfaces in public places - elevator buttons, door handles, handrails, handshaking with people, etc. Use a tissue or your sleeve to cover your hand or finger if you must touch something.\par - Wash your hands after touching surfaces in public places.\par - Avoid touching your face, nose, eyes, etc.\par - Clean and disinfect your home to remove germs: practice routine cleaning of frequently touched surfaces (for example: tables, doorknobs, light switches, handles, desks, toilets, faucets, sinks & cell phones)\par - Avoid crowds, especially in poorly ventilated spaces. Your risk of exposure to respiratory viruses like COVID-19 may increase in crowded, closed-in settings with little air circulation if\par there are people in the crowd who are sick. All patients are recommended to practice social distancing and stay at least 6 feet away from others.\par - Avoid all non-essential travel including plane trips, and especially avoid embarking on cruise ships.\par -If COVID-19 is spreading in your community, take extra measures to put distance between yourself and other people to further reduce your risk of being exposed to this new virus.\par -Stay home as much as possible.\par - Consider ways of getting food brought to your house through family, social, or commercial networks\par -Be aware that the virus has been known to live in the air up to 3 hours post exposure, cardboard up to 24 hours post exposure, copper up to 4 hours post exposure, steel and plastic up to 2-3 days post exposure. Risk of transmission from these surfaces are affected by many variables.\par \par Immune Support Recommendations:\par -OTC Vitamin C 500mg BID \par -OTC Quercetin 250-500mg BID \par -OTC Zinc 75-100mg per day \par -OTC Melatonin 1or 2mg a night \par -OTC Vitamin D 1-4000mg per day\par -Tonic Water 8oz\par -Recommended to Stay Hydrated (At least a gallon/day)\par \par Asthma and COVID19:\par You need to make sure your asthma is under control. This often requires the use of inhaled corticosteroids (and sometimes oral corticosteroids). Inhaled corticosteroids do not likely reduce your immune system’s ability to fight infections, but oral corticosteroids may. It is important to use the steps above to protect yourself to limit your exposure to any respiratory virus. \par \par problem 12: health maintenance \par -recommended to use Dr. Garcia's Intestinal Formula #1 \par -refused flu shot\par -recommended strep pneumonia vaccines: Prevnar-13 vaccine, followed by Pneumo vaccine 23 one year following\par -recommended early intervention for URIs\par -recommended regular osteoporosis evaluations\par -recommended early dermatological evaluations\par -recommended after the age of 50 to the age of 70, colonoscopy every 5 years \par \par Follow up in 4 months with spirometry and NiOx\par He is encouraged to call with any changes, concerns, or questions.

## 2021-08-10 NOTE — ADDENDUM
[FreeTextEntry1] : Documented by Giselle Sandra acting as a scribe for Dr. Trey Walton on 08/10/2021 \par \par All medical record entries made by the Scribe were at my, Dr. Trey Walton's, direction and personally dictated by me on 08/10/2021 . I have reviewed the chart and agree that the record accurately reflects my personal performance of the history, physical exam, assessment and plan. I have also personally directed, reviewed, and agree with the discharge instructions. \par

## 2021-11-02 ENCOUNTER — APPOINTMENT (OUTPATIENT)
Dept: PULMONOLOGY | Facility: CLINIC | Age: 46
End: 2021-11-02
Payer: MEDICARE

## 2021-11-02 VITALS
TEMPERATURE: 97.4 F | OXYGEN SATURATION: 98 % | HEIGHT: 70 IN | BODY MASS INDEX: 44.24 KG/M2 | WEIGHT: 309 LBS | HEART RATE: 74 BPM | DIASTOLIC BLOOD PRESSURE: 74 MMHG | SYSTOLIC BLOOD PRESSURE: 128 MMHG

## 2021-11-02 PROCEDURE — 99214 OFFICE O/P EST MOD 30 MIN: CPT

## 2021-11-02 RX ORDER — BECLOMETHASONE DIPROPIONATE HFA 80 UG/1
80 AEROSOL, METERED RESPIRATORY (INHALATION)
Qty: 3 | Refills: 1 | Status: DISCONTINUED | COMMUNITY
Start: 2018-08-09 | End: 2021-11-02

## 2021-11-02 RX ORDER — BECLOMETHASONE DIPROPIONATE HFA 80 UG/1
80 AEROSOL, METERED RESPIRATORY (INHALATION) TWICE DAILY
Qty: 3 | Refills: 1 | Status: DISCONTINUED | COMMUNITY
Start: 2019-05-09 | End: 2021-11-02

## 2021-11-02 RX ORDER — FLUTICASONE FUROATE AND VILANTEROL TRIFENATATE 100; 25 UG/1; UG/1
100-25 POWDER RESPIRATORY (INHALATION)
Qty: 1 | Refills: 1 | Status: DISCONTINUED | COMMUNITY
Start: 2017-12-14 | End: 2021-11-02

## 2021-11-02 RX ORDER — FLUTICASONE PROPIONATE 93 UG/1
93 SPRAY, METERED NASAL
Qty: 3 | Refills: 1 | Status: DISCONTINUED | COMMUNITY
Start: 2019-09-09 | End: 2021-11-02

## 2021-11-02 RX ORDER — FLUTICASONE FUROATE AND VILANTEROL TRIFENATATE 200; 25 UG/1; UG/1
200-25 POWDER RESPIRATORY (INHALATION)
Qty: 1 | Refills: 2 | Status: ACTIVE | COMMUNITY
Start: 2019-05-09 | End: 1900-01-01

## 2021-11-02 RX ORDER — ACLIDINIUM BROMIDE 400 UG/1
400 POWDER, METERED RESPIRATORY (INHALATION) TWICE DAILY
Qty: 3 | Refills: 1 | Status: DISCONTINUED | COMMUNITY
Start: 2019-05-09 | End: 2021-11-02

## 2021-11-02 NOTE — HISTORY OF PRESENT ILLNESS
[Former] : former [Current] : current [< 30 pack-years] : < 30 pack-years [TextBox_4] : Mr. Call is a 46 year old, former smoking - currently vaping, male. He presents to the office for a follow up visit for asthma/COPD, HTN, Obesity, allergic rhinitis, & GERD.\par \par His chief concern is "tightness in my lower lung since last week"\par \par He states symptoms have slightly improved since last week.  He admits to SOB on exertion & slight wheezing.  He states cough is at baseline. \par He notes he uses 1-2 puffs of Ventolin once per day. He notes he uses Symbicort 1 puff QD interchangeably with Breo 1 inhale as he has them both. Patient notes he had similar symptoms last time he was in office and he was supposed to have a Chest CT, but never did. \par \par Patient notes he quit smoking cigarettes 2 weeks ago.  He notes he switched to the use of a vape pen that he uses to satisfy his "nicotine tongue craving".  \par \par He denies fever/chills, fatigue, decreased appetite, SOB @ rest, or any other issues at this time.  [TextBox_11] : 0.5 [TextBox_13] : 33 [YearQuit] : 2021

## 2021-11-02 NOTE — REASON FOR VISIT
[Asthma] : asthma [Sleep Apnea] : sleep apnea [Cough] : cough [COPD] : COPD [Shortness of Breath] : shortness of breath [Obesity] : obesity [Nicotine Dependence] : nicotine dependence [Acute] : an acute visit

## 2021-11-02 NOTE — REVIEW OF SYSTEMS
[Chest Tightness] : chest tightness [SOB on Exertion] : sob on exertion [Obesity] : obesity [Negative] : Psychiatric [Wheezing] : wheezing [Cough] : no cough [Sputum] : no sputum [Dyspnea] : no dyspnea

## 2021-11-02 NOTE — ASSESSMENT
[FreeTextEntry1] : Mr. Call is a 46 year old, former smoking - currently vaping, male. He presents to the office for a follow up visit for asthma/COPD, HTN, Obesity, allergic rhinitis, & GERD. His chief concern is "tightness in my lower lung since last week".\par \par 1. ACOS:\par - Add Albuterol via nebulizer at least BID x 7 days.  Then can use 1 vial via neb or 2 puffs via HFA Q4-6H PRN.\par - Add Breo 200 mcg 1 inhale Q AM; rinse and gargle post use.\par - Discussed maintenance v inhaler therapy.\par - Instructions on how often to use the above medications written and provided to patient. \par - Chest CT RX'ed by Dr. Walton in 8/2021; pt to obtain.\par \par 2. IMMANUEL on CPAP:\par - I have discussed all the negative health consequences associated with obstructive and central sleep apnea including heart conditions/MI, hypertension, diabetes, chronic inflammation, memory issues, stroke, obesity, decreased libido, sleep related accidents, as well as anxiety and depression. Additional recommendations included: Avoid alcohol and sedatives at bedtime. Proper sleep hygiene such as maintaining a regular sleep routine, avoiding naps if possible, not watching TV or reading in bed,  and maintaining a quiet, comfortable bedroom. Sleepy driving avoidance and risks discussed with patient. Diet, exercise and weight loss suggested\par - Repeat HST to be performed as patient's device is very old.\par \par 3. Nicotine addiction:\par - Patient to decrease/discontinue vape pen use.\par \par Patient to follow up with Dr. Walton as scheduled.\par Patient to call with further questions and concerns.\par Patient verbalizes understanding of care plan and is agreeable.\par \par

## 2021-11-19 ENCOUNTER — APPOINTMENT (OUTPATIENT)
Dept: CT IMAGING | Facility: IMAGING CENTER | Age: 46
End: 2021-11-19

## 2021-11-30 ENCOUNTER — APPOINTMENT (OUTPATIENT)
Dept: CT IMAGING | Facility: IMAGING CENTER | Age: 46
End: 2021-11-30
Payer: MEDICARE

## 2021-11-30 ENCOUNTER — OUTPATIENT (OUTPATIENT)
Dept: OUTPATIENT SERVICES | Facility: HOSPITAL | Age: 46
LOS: 1 days | End: 2021-11-30
Payer: COMMERCIAL

## 2021-11-30 DIAGNOSIS — K42.0 UMBILICAL HERNIA WITH OBSTRUCTION, WITHOUT GANGRENE: Chronic | ICD-10-CM

## 2021-11-30 DIAGNOSIS — J44.9 CHRONIC OBSTRUCTIVE PULMONARY DISEASE, UNSPECIFIED: ICD-10-CM

## 2021-11-30 DIAGNOSIS — D17.20 BENIGN LIPOMATOUS NEOPLASM OF SKIN AND SUBCUTANEOUS TISSUE OF UNSPECIFIED LIMB: Chronic | ICD-10-CM

## 2021-11-30 PROCEDURE — 71250 CT THORAX DX C-: CPT

## 2021-11-30 PROCEDURE — 71250 CT THORAX DX C-: CPT | Mod: 26

## 2021-12-02 ENCOUNTER — NON-APPOINTMENT (OUTPATIENT)
Age: 46
End: 2021-12-02

## 2021-12-10 ENCOUNTER — APPOINTMENT (OUTPATIENT)
Dept: PULMONOLOGY | Facility: CLINIC | Age: 46
End: 2021-12-10

## 2021-12-30 ENCOUNTER — APPOINTMENT (OUTPATIENT)
Dept: SLEEP CENTER | Facility: CLINIC | Age: 46
End: 2021-12-30
Payer: MEDICARE

## 2021-12-30 PROCEDURE — 95806 SLEEP STUDY UNATT&RESP EFFT: CPT | Mod: 26

## 2022-01-03 ENCOUNTER — OUTPATIENT (OUTPATIENT)
Dept: OUTPATIENT SERVICES | Facility: HOSPITAL | Age: 47
LOS: 1 days | End: 2022-01-03
Payer: COMMERCIAL

## 2022-01-03 DIAGNOSIS — D17.20 BENIGN LIPOMATOUS NEOPLASM OF SKIN AND SUBCUTANEOUS TISSUE OF UNSPECIFIED LIMB: Chronic | ICD-10-CM

## 2022-01-03 DIAGNOSIS — K42.0 UMBILICAL HERNIA WITH OBSTRUCTION, WITHOUT GANGRENE: Chronic | ICD-10-CM

## 2022-01-03 PROCEDURE — 95806 SLEEP STUDY UNATT&RESP EFFT: CPT

## 2022-01-11 DIAGNOSIS — G47.33 OBSTRUCTIVE SLEEP APNEA (ADULT) (PEDIATRIC): ICD-10-CM

## 2022-02-04 ENCOUNTER — APPOINTMENT (OUTPATIENT)
Dept: PULMONOLOGY | Facility: CLINIC | Age: 47
End: 2022-02-04
Payer: MEDICARE

## 2022-02-04 ENCOUNTER — NON-APPOINTMENT (OUTPATIENT)
Age: 47
End: 2022-02-04

## 2022-02-04 VITALS
BODY MASS INDEX: 43.52 KG/M2 | DIASTOLIC BLOOD PRESSURE: 70 MMHG | OXYGEN SATURATION: 96 % | TEMPERATURE: 97.9 F | SYSTOLIC BLOOD PRESSURE: 130 MMHG | HEART RATE: 87 BPM | WEIGHT: 304 LBS | HEIGHT: 70 IN | RESPIRATION RATE: 16 BRPM

## 2022-02-04 PROCEDURE — 95012 NITRIC OXIDE EXP GAS DETER: CPT

## 2022-02-04 PROCEDURE — 99406 BEHAV CHNG SMOKING 3-10 MIN: CPT | Mod: 25

## 2022-02-04 PROCEDURE — 94010 BREATHING CAPACITY TEST: CPT

## 2022-02-04 PROCEDURE — 99214 OFFICE O/P EST MOD 30 MIN: CPT | Mod: 25

## 2022-02-04 NOTE — ADDENDUM
[FreeTextEntry1] : Documented by Hossein Malagon acting as a scribe for Dr. Trey Walton on (02/04/2022).\par \par All medical record entries made by the Scribe were at my, Dr. Trey Walton's, direction and personally dictated by me on (02/04/2022). I have reviewed the chart and agree that the record accurately reflects my personal performance of the history, physical exam, assessment and plan. I have also personally directed, reviewed, and agree with the discharge instructions.\par

## 2022-02-04 NOTE — HISTORY OF PRESENT ILLNESS
[FreeTextEntry1] : Mr. Call is a 46 year old male presenting to the office for a follow up visit for asthma, allergic rhinitis, COPD, GERD, nicotine addiction, IMMANUEL, snoring, and shortness of breath. His chief complaint is\par -he is doing well in general \par -he energy level is low \par -He notes chest pain b/l when coughing for the\par -He is coughing and wheezing intermittently on exertion \par -He is still smoking \par -he is intermittently coughing up phlegm \par -his weight is stable, around 300lbs \par -he notes hip and back pain, which is limiting his exercise; awaiting orthopedic evaluation \par -he is sleeping well in general; 6-7 hours per night \par -no new medications, vitamins, or supplements \par -he is using his inhalers regularly \par -he is awaiting ENT eval\par -\par \par - patient denies any headaches, nausea, vomiting, fever, chills, sweats, chest pain, chest pressure, palpitations, coughing, wheezing, fatigue, diarrhea, constipation, dysphagia, myalgias, dizziness, leg swelling, leg pain, itchy eyes, itchy ears, heartburn, reflux or sour taste in the mouth

## 2022-02-04 NOTE — PROCEDURE
[FreeTextEntry1] : PFT revealed normal flows, with a FEV1 of 3.93L, which is 93% of predicted, with a normal flow volume loop\par \par Feno was 13; a normal value being less than 25. Fractional exhaled nitric oxide (FENO) is regarded as a simple, noninvasive method for assessing eosinophilic airway inflammation. Produced by a variety of cells within the lung, nitric oxide (NO) concentrations are generally low in healthy individuals. However, high concentrations of NO appear to be involved in nonspecific host defense mechanisms and chronic inflammatory  diseases such as asthma. The American Thoracic Society (ATS) therefore recommended using FENO to aid in the diagnosis and monitoring of eosinophilic airway inflammation and asthma, and for identifying steroid responsive individuals whose chronic respiratory symptoms may be caused by airway inflammation \par \par CT chest 11/30/2021 reveals Mild upper tracheal stenosis. Clear lungs.\par \par \par -Images and procedures reviewed in detail and discussed with patient.

## 2022-02-04 NOTE — REASON FOR VISIT
[Follow-Up] : a follow-up visit [FreeTextEntry1] : asthma, allergic rhinitis, tracheal stenosis, COPD, GERD, nicotine addiction, IMMANUEL, snoring, and shortness of breath

## 2022-02-24 ENCOUNTER — NON-APPOINTMENT (OUTPATIENT)
Age: 47
End: 2022-02-24

## 2022-02-24 ENCOUNTER — APPOINTMENT (OUTPATIENT)
Dept: THORACIC SURGERY | Facility: CLINIC | Age: 47
End: 2022-02-24
Payer: MEDICARE

## 2022-02-24 VITALS
HEART RATE: 73 BPM | BODY MASS INDEX: 41.45 KG/M2 | WEIGHT: 306 LBS | OXYGEN SATURATION: 96 % | HEIGHT: 72 IN | SYSTOLIC BLOOD PRESSURE: 143 MMHG | TEMPERATURE: 98.1 F | DIASTOLIC BLOOD PRESSURE: 80 MMHG

## 2022-02-24 DIAGNOSIS — Z86.69 PERSONAL HISTORY OF OTHER DISEASES OF THE NERVOUS SYSTEM AND SENSE ORGANS: ICD-10-CM

## 2022-02-24 DIAGNOSIS — Z86.39 PERSONAL HISTORY OF OTHER ENDOCRINE, NUTRITIONAL AND METABOLIC DISEASE: ICD-10-CM

## 2022-02-24 PROCEDURE — 99205 OFFICE O/P NEW HI 60 MIN: CPT

## 2022-02-25 ENCOUNTER — APPOINTMENT (OUTPATIENT)
Dept: ORTHOPEDIC SURGERY | Facility: CLINIC | Age: 47
End: 2022-02-25
Payer: MEDICARE

## 2022-02-25 VITALS
HEIGHT: 72 IN | WEIGHT: 306 LBS | HEART RATE: 73 BPM | SYSTOLIC BLOOD PRESSURE: 144 MMHG | DIASTOLIC BLOOD PRESSURE: 81 MMHG | BODY MASS INDEX: 41.45 KG/M2

## 2022-02-25 PROBLEM — Z86.39 HISTORY OF TYPE 2 DIABETES MELLITUS: Status: RESOLVED | Noted: 2022-02-25 | Resolved: 2022-02-25

## 2022-02-25 PROBLEM — Z86.69 HISTORY OF OBSTRUCTIVE SLEEP APNEA: Status: RESOLVED | Noted: 2022-02-25 | Resolved: 2022-02-25

## 2022-02-25 PROCEDURE — 73521 X-RAY EXAM HIPS BI 2 VIEWS: CPT

## 2022-02-25 PROCEDURE — 99203 OFFICE O/P NEW LOW 30 MIN: CPT

## 2022-02-25 PROCEDURE — 72100 X-RAY EXAM L-S SPINE 2/3 VWS: CPT

## 2022-02-25 NOTE — CONSULT LETTER
[Dear  ___] : Dear  [unfilled], [Consult Letter:] : I had the pleasure of evaluating your patient, [unfilled]. [( Thank you for referring [unfilled] for consultation for _____ )] : Thank you for referring [unfilled] for consultation for [unfilled] [Please see my note below.] : Please see my note below. [Consult Closing:] : Thank you very much for allowing me to participate in the care of this patient.  If you have any questions, please do not hesitate to contact me. [Sincerely,] : Sincerely, [FreeTextEntry2] : Dr. Trey Walton (Pulm/Ref) [FreeTextEntry3] : Yfn Perrin MD, MPH \par System Director of Thoracic Surgery \par Director of Comprehensive Lung and Foregut Sells \par Professor Cardiovascular & Thoracic Surgery \par [facility] [employer] [facility]\par \par

## 2022-02-25 NOTE — ASSESSMENT
[FreeTextEntry1] : Mr. JAZMYN MURGUIA, 46 year old male, current smoker, w/ hx of HTN, DM2, obesity, COPD/asthma, heart murmur, IMMANUEL on CPAP, who f/u with Dr. Walton for COPD/asthma, referred here today for tracheal stenosis. \par \par CT chest on 11/30/2021:\par - mild upper tracheal stenosis. The segment above the stenosis measures 2.4 x 2.3 cm, the surface area at this level measures 3.2 sq cm. At the level of stenosis it measures 1.2 x 1.3 cm; 1.2 sq cm. Below the stenosis measures 2.1 x 1.7 cm; 2.7 sq cm. \par \par Spirometry on 02/04/2022: FVC 81%, FEV1 87%. \par \par I have reviewed the patient's medical records and diagnostic images at time of this office consultation and have made the following recommendation:\par 1. CT scan reviewed, I recommended a Flex Bronch, possible Laser on 3/24/22. Risks and benefits and alternatives explained to patient, all questions answered, patient agreed to proceed with procedure.\par 2. Medical clearance, PST, COVID\par \par \par I personally performed the services described in the documentation, reviewed the documentation recorded by the scribe in my presence and it accurately and completely records my words and actions.\par \par I, Bharati Penn NP, am scribing for and the presence of SANTI Cheung, the following sections HISTORY OF PRESENT ILLNESS, PAST MEDICAL/FAMILY/SOCIAL HISTORY; REVIEW OF SYSTEMS; VITAL SIGNS; PHYSICAL EXAM; DISPOSITION.\par \par

## 2022-02-25 NOTE — DISCUSSION/SUMMARY
[de-identified] : The patient has a lumbar spine sprain.  I have discussed the pathology and natural history with him.  He is referred for physical therapy.  He will return in 1 month.  I find no intrinsic pathology in his hips.

## 2022-02-25 NOTE — HISTORY OF PRESENT ILLNESS
[de-identified] : 46 year old male presents today c/o left hip pain. Three weeks ago he bent over to look out of his window and while standing up felt severe pain on the left side of his lower back. He has difficulty standing straight and reports that he was walking hunched over for several days. He has been using Bengay and heat with minimal relief. Over the last two weeks he has also developed bilateral groin pain, R>L and sharp stabbing pain in both thighs. He denies lower extremity numbness or tingling. He states that he is on disability for sleep apnea and COPD.

## 2022-02-25 NOTE — PHYSICAL EXAM
[Fully active, able to carry on all pre-disease performance without restriction] : Status 0 - Fully active, able to carry on all pre-disease performance without restriction [General Appearance - Alert] : alert [General Appearance - In No Acute Distress] : in no acute distress [Sclera] : the sclera and conjunctiva were normal [PERRL With Normal Accommodation] : pupils were equal in size, round, and reactive to light [Extraocular Movements] : extraocular movements were intact [Outer Ear] : the ears and nose were normal in appearance [Oropharynx] : the oropharynx was normal [Neck Appearance] : the appearance of the neck was normal [Neck Cervical Mass (___cm)] : no neck mass was observed [Jugular Venous Distention Increased] : there was no jugular-venous distention [Thyroid Diffuse Enlargement] : the thyroid was not enlarged [Thyroid Nodule] : there were no palpable thyroid nodules [Auscultation Breath Sounds / Voice Sounds] : lungs were clear to auscultation bilaterally [Heart Rate And Rhythm] : heart rate was normal and rhythm regular [Heart Sounds] : normal S1 and S2 [Heart Sounds Gallop] : no gallops [Murmurs] : no murmurs [Heart Sounds Pericardial Friction Rub] : no pericardial rub [Examination Of The Chest] : the chest was normal in appearance [Chest Visual Inspection Thoracic Asymmetry] : no chest asymmetry [Diminished Respiratory Excursion] : normal chest expansion [2+] : left 2+ [Breast Appearance] : normal in appearance [Breast Palpation Mass] : no palpable masses [Bowel Sounds] : normal bowel sounds [Abdomen Soft] : soft [Abdomen Tenderness] : non-tender [Abdomen Mass (___ Cm)] : no abdominal mass palpated [Cervical Lymph Nodes Enlarged Posterior Bilaterally] : posterior cervical [Cervical Lymph Nodes Enlarged Anterior Bilaterally] : anterior cervical [Supraclavicular Lymph Nodes Enlarged Bilaterally] : supraclavicular [No CVA Tenderness] : no ~M costovertebral angle tenderness [No Spinal Tenderness] : no spinal tenderness [Abnormal Walk] : normal gait [Musculoskeletal - Swelling] : no joint swelling seen [Nail Clubbing] : no clubbing  or cyanosis of the fingernails [Motor Tone] : muscle strength and tone were normal [Skin Color & Pigmentation] : normal skin color and pigmentation [Skin Turgor] : normal skin turgor [] : no rash [Deep Tendon Reflexes (DTR)] : deep tendon reflexes were 2+ and symmetric [Sensation] : the sensory exam was normal to light touch and pinprick [No Focal Deficits] : no focal deficits [Oriented To Time, Place, And Person] : oriented to person, place, and time [Impaired Insight] : insight and judgment were intact [Affect] : the affect was normal [FreeTextEntry1] : deferred

## 2022-02-25 NOTE — HISTORY OF PRESENT ILLNESS
[FreeTextEntry1] : Mr. JAZMYN MURGUIA, 46 year old male, current smoker, w/ hx of HTN, DM2, obesity, COPD/asthma, heart murmur, IMMANUEL on CPAP, who f/u with Dr. Walton for COPD/asthma, referred here today for tracheal stenosis. \par \par CT chest on 11/30/2021:\par - mild upper tracheal stenosis. The segment above the stenosis measures 2.4 x 2.3 cm, the surface area at this level measures 3.2 sq cm. At the level of stenosis it measures 1.2 x 1.3 cm; 1.2 sq cm. Below the stenosis measures 2.1 x 1.7 cm; 2.7 sq cm. \par \par Spirometry on 02/04/2022: FVC 81%, FEV1 87%. \par \par Of note, patient also referred to Dr. Martin (ENT) on 05/12/2022. \par \par Patient is here today for CT surgery consultation, referred by Dr. Trey Walton (Pulm).  \par

## 2022-02-25 NOTE — PHYSICAL EXAM
[DP] : dorsalis pedis 2+ and symmetric bilaterally [PT] : posterior tibial 2+ and symmetric bilaterally [Normal] : Alert and in no acute distress [Poor Appearance] : well-appearing [Acute Distress] : not in acute distress [Obese] : not obese [de-identified] : The patient has no respiratory distress. Mood and affect are normal. The patient is alert and oriented to person, place and time.\par Examination of the lumbar spine demonstrates tenderness to the left of the midline. There is mild muscle spasm. There is no deformity. Lumbar flexion is 90°, right lateral flexion 10° and left lateral flexion 10°. Straight leg raise test is negative. Lower extremity neurologic exam is intact with regard to sensation, motor function and deep tendon reflexes.  Trendelenburg is negative.  There is some stiffness of the left hip but no pain with hip motion.  There is no pain with knee motion.  The calves are soft and nontender.  The skin is intact.  There is no lymphedema. [de-identified] : AP and lateral x-rays of the lumbar spine demonstrate mild degenerative changes.  AP and lateral x-rays of both hips demonstrate no fracture, no dislocation and no bony abnormality.

## 2022-03-07 ENCOUNTER — LABORATORY RESULT (OUTPATIENT)
Age: 47
End: 2022-03-07

## 2022-03-11 ENCOUNTER — OUTPATIENT (OUTPATIENT)
Dept: OUTPATIENT SERVICES | Facility: HOSPITAL | Age: 47
LOS: 1 days | End: 2022-03-11
Payer: MEDICARE

## 2022-03-11 VITALS
TEMPERATURE: 97 F | DIASTOLIC BLOOD PRESSURE: 80 MMHG | SYSTOLIC BLOOD PRESSURE: 140 MMHG | WEIGHT: 306 LBS | RESPIRATION RATE: 17 BRPM | OXYGEN SATURATION: 99 % | HEART RATE: 72 BPM | HEIGHT: 72 IN

## 2022-03-11 DIAGNOSIS — I10 ESSENTIAL (PRIMARY) HYPERTENSION: ICD-10-CM

## 2022-03-11 DIAGNOSIS — J39.8 OTHER SPECIFIED DISEASES OF UPPER RESPIRATORY TRACT: ICD-10-CM

## 2022-03-11 DIAGNOSIS — D17.20 BENIGN LIPOMATOUS NEOPLASM OF SKIN AND SUBCUTANEOUS TISSUE OF UNSPECIFIED LIMB: Chronic | ICD-10-CM

## 2022-03-11 DIAGNOSIS — G47.30 SLEEP APNEA, UNSPECIFIED: ICD-10-CM

## 2022-03-11 DIAGNOSIS — E11.9 TYPE 2 DIABETES MELLITUS WITHOUT COMPLICATIONS: ICD-10-CM

## 2022-03-11 DIAGNOSIS — J44.9 CHRONIC OBSTRUCTIVE PULMONARY DISEASE, UNSPECIFIED: ICD-10-CM

## 2022-03-11 DIAGNOSIS — K42.0 UMBILICAL HERNIA WITH OBSTRUCTION, WITHOUT GANGRENE: Chronic | ICD-10-CM

## 2022-03-11 LAB
A1C WITH ESTIMATED AVERAGE GLUCOSE RESULT: 5.3 % — SIGNIFICANT CHANGE UP (ref 4–5.6)
ANION GAP SERPL CALC-SCNC: 15 MMOL/L — HIGH (ref 7–14)
BUN SERPL-MCNC: 13 MG/DL — SIGNIFICANT CHANGE UP (ref 7–23)
CALCIUM SERPL-MCNC: 9.3 MG/DL — SIGNIFICANT CHANGE UP (ref 8.4–10.5)
CHLORIDE SERPL-SCNC: 101 MMOL/L — SIGNIFICANT CHANGE UP (ref 98–107)
CO2 SERPL-SCNC: 19 MMOL/L — LOW (ref 22–31)
CREAT SERPL-MCNC: 0.74 MG/DL — SIGNIFICANT CHANGE UP (ref 0.5–1.3)
EGFR: 114 ML/MIN/1.73M2 — SIGNIFICANT CHANGE UP
ESTIMATED AVERAGE GLUCOSE: 105 — SIGNIFICANT CHANGE UP
GLUCOSE SERPL-MCNC: 98 MG/DL — SIGNIFICANT CHANGE UP (ref 70–99)
HCT VFR BLD CALC: 49.8 % — SIGNIFICANT CHANGE UP (ref 39–50)
HGB BLD-MCNC: 16.8 G/DL — SIGNIFICANT CHANGE UP (ref 13–17)
MCHC RBC-ENTMCNC: 31.7 PG — SIGNIFICANT CHANGE UP (ref 27–34)
MCHC RBC-ENTMCNC: 33.7 GM/DL — SIGNIFICANT CHANGE UP (ref 32–36)
MCV RBC AUTO: 94 FL — SIGNIFICANT CHANGE UP (ref 80–100)
NRBC # BLD: 0 /100 WBCS — SIGNIFICANT CHANGE UP
NRBC # FLD: 0 K/UL — SIGNIFICANT CHANGE UP
PLATELET # BLD AUTO: 208 K/UL — SIGNIFICANT CHANGE UP (ref 150–400)
POTASSIUM SERPL-MCNC: 4.1 MMOL/L — SIGNIFICANT CHANGE UP (ref 3.5–5.3)
POTASSIUM SERPL-SCNC: 4.1 MMOL/L — SIGNIFICANT CHANGE UP (ref 3.5–5.3)
RBC # BLD: 5.3 M/UL — SIGNIFICANT CHANGE UP (ref 4.2–5.8)
RBC # FLD: 13.6 % — SIGNIFICANT CHANGE UP (ref 10.3–14.5)
SODIUM SERPL-SCNC: 135 MMOL/L — SIGNIFICANT CHANGE UP (ref 135–145)
WBC # BLD: 8.49 K/UL — SIGNIFICANT CHANGE UP (ref 3.8–10.5)
WBC # FLD AUTO: 8.49 K/UL — SIGNIFICANT CHANGE UP (ref 3.8–10.5)

## 2022-03-11 PROCEDURE — 93010 ELECTROCARDIOGRAM REPORT: CPT

## 2022-03-11 RX ORDER — SODIUM CHLORIDE 9 MG/ML
1000 INJECTION, SOLUTION INTRAVENOUS
Refills: 0 | Status: DISCONTINUED | OUTPATIENT
Start: 2022-04-07 | End: 2022-04-21

## 2022-03-11 NOTE — H&P PST ADULT - PROBLEM SELECTOR PLAN 4
Patient instructed to take montelukast  with a sip of water on the morning of procedure.   Continue Symbicort. LD of Synjardy on 03/20/22.  Check fingerstick DOS.    Pt with Morbid Obesity and multiple comorbidities- Requesting medical clearance prior to surgery.

## 2022-03-11 NOTE — H&P PST ADULT - PROBLEM SELECTOR PLAN 5
LD of Synjardy on 03/20/22.  Check fingerstick DOS.    Pt with Morbid Obesity and multiple comorbidities- Requesting medical clearance prior to surgery. Patient instructed to take montelukast  with a sip of water on the morning of procedure.   Continue Symbicort.

## 2022-03-11 NOTE — H&P PST ADULT - ASSESSMENT
with pre op diagnosis of other specified diseases upper respiratory tract, for pre op evaluation prior to surgery- Flexible bronchoscopy possible Yag Laser.

## 2022-03-11 NOTE — H&P PST ADULT - NSICDXFAMILYHX_GEN_ALL_CORE_FT
FAMILY HISTORY:  Mother  Still living? Yes, Estimated age: Age Unknown  Family history of asthma in mother, Age at diagnosis: Age Unknown

## 2022-03-11 NOTE — H&P PST ADULT - PROBLEM SELECTOR PLAN 1
Pt is tentatively scheduled for Flexible bronchoscopy possible Yag Laser on 03/24/22.    Pepcid provided for GI prophylaxis.   Pre op instructions given. Pt verbalized understanding.    Pt strongly advised to follow up with surgeon to discuss COVID testing requirements prior to procedure.

## 2022-03-11 NOTE — H&P PST ADULT - HISTORY OF PRESENT ILLNESS
46 year old male with PMH of HTN, DM2, Obesity, IMMANUEL- CPAP, COPD/Asthma, Heart murmur, presents to PST with pre op diagnosis of other specified diseases upper respiratory tract, for pre op evaluation prior to surgery- Flexible bronchoscopy possible Yag Laser.

## 2022-03-11 NOTE — H&P PST ADULT - NEGATIVE NEUROLOGICAL SYMPTOMS
no transient paralysis/no weakness/no paresthesias/no generalized seizures/no syncope/no tremors/no vertigo/no loss of sensation/no difficulty walking/no headache/no confusion

## 2022-03-11 NOTE — H&P PST ADULT - PROBLEM SELECTOR PLAN 3
Patient instructed to take Telmisartan and metoprolol with a sip of water on the morning of procedure.  Patient with GARDNER and METS < 4- Requesting cardiology clearance prior to surgery. Next cardiology appt on 03/15/22.    Will request echo and comparison EKG. Patient instructed to take Telmisartan and metoprolol with a sip of water on the morning of procedure.  Patient with GARDNER and METS < 4- Requesting cardiology clearance prior to surgery. Next cardiology appt on 03/15/22.  Case discussed with Dr Juarez regarding clearance.  Will request echo and comparison EKG.

## 2022-03-11 NOTE — H&P PST ADULT - NSICDXPASTSURGICALHX_GEN_ALL_CORE_FT
PAST SURGICAL HISTORY:  Lipoma of axilla Excision left axilla lipoma    Umbilical hernia with obstruction 2015

## 2022-03-11 NOTE — H&P PST ADULT - NEGATIVE ENMT SYMPTOMS
no hearing difficulty/no ear pain/no tinnitus/no vertigo/no sinus symptoms/no nasal congestion/no nasal discharge/no post-nasal discharge/no nose bleeds

## 2022-03-11 NOTE — H&P PST ADULT - NSICDXPASTMEDICALHX_GEN_ALL_CORE_FT
PAST MEDICAL HISTORY:  Asthma with allergic rhinitis     DM (diabetes mellitus) Type II    GERD (gastroesophageal reflux disease)     HTN (hypertension)     Morbid obesity     Neuropathy     Sleep apnea

## 2022-03-28 ENCOUNTER — APPOINTMENT (OUTPATIENT)
Dept: ORTHOPEDIC SURGERY | Facility: CLINIC | Age: 47
End: 2022-03-28
Payer: MEDICARE

## 2022-03-28 VITALS
SYSTOLIC BLOOD PRESSURE: 154 MMHG | HEART RATE: 65 BPM | WEIGHT: 306 LBS | HEIGHT: 72 IN | BODY MASS INDEX: 41.45 KG/M2 | DIASTOLIC BLOOD PRESSURE: 82 MMHG

## 2022-03-28 PROBLEM — J45.909 UNSPECIFIED ASTHMA, UNCOMPLICATED: Chronic | Status: ACTIVE | Noted: 2022-03-11

## 2022-03-28 PROCEDURE — 99214 OFFICE O/P EST MOD 30 MIN: CPT

## 2022-03-28 PROCEDURE — 73564 X-RAY EXAM KNEE 4 OR MORE: CPT | Mod: RT

## 2022-03-28 NOTE — DISCUSSION/SUMMARY
[de-identified] : The patient has mild arthritic change in his knee and in his spine.  I recommend physical therapy for both problems.  I recommend a course of nabumetone.  Medication risks have been reviewed.  He will be reevaluated in 6 weeks.

## 2022-03-28 NOTE — HISTORY OF PRESENT ILLNESS
[de-identified] : The patient presents for follow up of a lumbar sprain. He reports that he no longer feels pain but "discomfort" on the left side of his lower back when sitting and when lying down. He did not start physical therapy because he did not take his prescription last visit. He also complains of right knee pain x 2-3 weeks. There was no injury or trauma. He describes pain over the posterolateral aspect of the knee which worsens when bending his knee. He has difficulty climbing steps. He is using Bengay for pain relief.

## 2022-03-28 NOTE — PHYSICAL EXAM
[DP] : dorsalis pedis 2+ and symmetric bilaterally [PT] : posterior tibial 2+ and symmetric bilaterally [Normal] : Alert and in no acute distress [Poor Appearance] : well-appearing [Acute Distress] : not in acute distress [Obese] : not obese [de-identified] : The patient has no respiratory distress. Mood and affect are normal. The patient is alert and oriented to person, place and time.\par Examination of the lumbar spine demonstrates tenderness to the left of the midline. There is mild muscle spasm. There is no deformity. Lumbar flexion is 90°, right lateral flexion 10° and left lateral flexion 10°. Straight leg raise test is negative. Lower extremity neurologic exam is intact with regard to sensation, motor function and deep tendon reflexes.  Trendelenburg is negative.  There is some stiffness of the left hip but no pain with hip motion.  The patient reports pain with motion of the right knee.  There is mild posterior tenderness.  There is no instability of the ligaments.  Range of motion 0-95 limited by pain.  The calves are soft and nontender.  The skin is intact.  There is no lymphedema. [de-identified] : AP, lateral, tunnel and sunrise x-rays of the right knee taken today demonstrate no fracture or dislocation.  There are mild degenerative changes.

## 2022-04-06 ENCOUNTER — TRANSCRIPTION ENCOUNTER (OUTPATIENT)
Age: 47
End: 2022-04-06

## 2022-04-07 ENCOUNTER — TRANSCRIPTION ENCOUNTER (OUTPATIENT)
Age: 47
End: 2022-04-07

## 2022-04-07 ENCOUNTER — APPOINTMENT (OUTPATIENT)
Dept: THORACIC SURGERY | Facility: HOSPITAL | Age: 47
End: 2022-04-07

## 2022-04-07 ENCOUNTER — OUTPATIENT (OUTPATIENT)
Dept: OUTPATIENT SERVICES | Facility: HOSPITAL | Age: 47
LOS: 1 days | Discharge: HOME | End: 2022-04-07
Payer: MEDICARE

## 2022-04-07 ENCOUNTER — RESULT REVIEW (OUTPATIENT)
Age: 47
End: 2022-04-07

## 2022-04-07 VITALS
RESPIRATION RATE: 17 BRPM | TEMPERATURE: 98 F | SYSTOLIC BLOOD PRESSURE: 123 MMHG | HEART RATE: 63 BPM | DIASTOLIC BLOOD PRESSURE: 75 MMHG | HEIGHT: 72 IN | OXYGEN SATURATION: 97 % | WEIGHT: 306 LBS

## 2022-04-07 VITALS
HEART RATE: 67 BPM | SYSTOLIC BLOOD PRESSURE: 139 MMHG | DIASTOLIC BLOOD PRESSURE: 61 MMHG | RESPIRATION RATE: 18 BRPM | OXYGEN SATURATION: 94 %

## 2022-04-07 DIAGNOSIS — D17.20 BENIGN LIPOMATOUS NEOPLASM OF SKIN AND SUBCUTANEOUS TISSUE OF UNSPECIFIED LIMB: Chronic | ICD-10-CM

## 2022-04-07 DIAGNOSIS — J39.8 OTHER SPECIFIED DISEASES OF UPPER RESPIRATORY TRACT: ICD-10-CM

## 2022-04-07 DIAGNOSIS — K42.0 UMBILICAL HERNIA WITH OBSTRUCTION, WITHOUT GANGRENE: Chronic | ICD-10-CM

## 2022-04-07 LAB — GLUCOSE BLDC GLUCOMTR-MCNC: 98 MG/DL — SIGNIFICANT CHANGE UP (ref 70–99)

## 2022-04-07 PROCEDURE — 71045 X-RAY EXAM CHEST 1 VIEW: CPT | Mod: 26

## 2022-04-07 PROCEDURE — 88305 TISSUE EXAM BY PATHOLOGIST: CPT | Mod: 26

## 2022-04-07 PROCEDURE — 31625 BRONCHOSCOPY W/BIOPSY(S): CPT

## 2022-04-07 RX ORDER — GABAPENTIN 400 MG/1
1 CAPSULE ORAL
Qty: 0 | Refills: 0 | DISCHARGE

## 2022-04-07 RX ORDER — MONTELUKAST 4 MG/1
1 TABLET, CHEWABLE ORAL
Qty: 0 | Refills: 0 | DISCHARGE

## 2022-04-07 RX ORDER — TELMISARTAN AND HYDROCHLOROTHIAZIDE 40; 12.5 MG/1; MG/1
1 TABLET ORAL
Qty: 0 | Refills: 0 | DISCHARGE

## 2022-04-07 RX ORDER — EMPAGLIFLOZIN, METFORMIN HYDROCHLORIDE 10; 1000 MG/1; MG/1
1 TABLET, EXTENDED RELEASE ORAL
Qty: 0 | Refills: 0 | DISCHARGE

## 2022-04-07 RX ORDER — ASPIRIN/CALCIUM CARB/MAGNESIUM 324 MG
1 TABLET ORAL
Qty: 0 | Refills: 0 | DISCHARGE

## 2022-04-07 RX ORDER — METOPROLOL TARTRATE 50 MG
1 TABLET ORAL
Qty: 0 | Refills: 0 | DISCHARGE

## 2022-04-07 RX ORDER — ALLOPURINOL 300 MG
1 TABLET ORAL
Qty: 0 | Refills: 0 | DISCHARGE

## 2022-04-07 RX ORDER — ERGOCALCIFEROL 1.25 MG/1
0 CAPSULE ORAL
Qty: 0 | Refills: 0 | DISCHARGE

## 2022-04-07 RX ORDER — BUDESONIDE AND FORMOTEROL FUMARATE DIHYDRATE 160; 4.5 UG/1; UG/1
2 AEROSOL RESPIRATORY (INHALATION)
Qty: 0 | Refills: 0 | DISCHARGE

## 2022-04-07 RX ORDER — ATORVASTATIN CALCIUM 80 MG/1
1 TABLET, FILM COATED ORAL
Qty: 0 | Refills: 0 | DISCHARGE

## 2022-04-07 NOTE — ASU DISCHARGE PLAN (ADULT/PEDIATRIC) - ASU DC SPECIAL INSTRUCTIONSFT
Please follow up with Dr Yfn Perrin in the outpatient office in 7-10 business days.   Call the outpatient office at [phone] for your appointment or with questions.      You may take over the counter cough drops if you experience sore throat/cough immediately and following post procedure. If symptoms worsen/persists contact your primary care doctor.

## 2022-04-07 NOTE — ASU PATIENT PROFILE, ADULT - FALL HARM RISK - UNIVERSAL INTERVENTIONS
Bed in lowest position, wheels locked, appropriate side rails in place/Call bell, personal items and telephone in reach/Instruct patient to call for assistance before getting out of bed or chair/Non-slip footwear when patient is out of bed/[place] to call system/Physically safe environment - no spills, clutter or unnecessary equipment/Purposeful Proactive Rounding/Room/bathroom lighting operational, light cord in reach

## 2022-04-07 NOTE — ASU DISCHARGE PLAN (ADULT/PEDIATRIC) - CALL YOUR DOCTOR IF YOU HAVE ANY OF THE FOLLOWING:
Inability to tolerate liquids or foods Bleeding that does not stop/Nausea and vomiting that does not stop/Inability to tolerate liquids or foods

## 2022-04-07 NOTE — ASU DISCHARGE PLAN (ADULT/PEDIATRIC) - NURSING INSTRUCTIONS
Call MD if you experience shortness of breath or difficulty breathing.
Head is atraumatic. Head shape is symmetrical.

## 2022-04-07 NOTE — ASU DISCHARGE PLAN (ADULT/PEDIATRIC) - CARE PROVIDER_API CALL
Yfn Perrin (MD)  Surgery; Thoracic Surgery  [id number] [place] [address]  Phone: [phone]  Fax: [phone]  Follow Up Time:

## 2022-04-07 NOTE — BRIEF OPERATIVE NOTE - COMMENTS
I, SHANTI Bunch served as first assistant on this procedure, my involvement included positioning, retrieving specimens from the field, as well as other tasks as directed by the surgeon.

## 2022-04-07 NOTE — BRIEF OPERATIVE NOTE - OPERATION/FINDINGS
Flexible bronchoscopy via LMA performed. There was about 4mm of subglottic stricture upon passage of scope, with what appeared to be residuals of food in airway. Biopsy of subglottic airway performed.

## 2022-04-07 NOTE — BRIEF OPERATIVE NOTE - NSICDXBRIEFPROCEDURE_GEN_ALL_CORE_FT
PROCEDURES:  Flexible bronchoscopy 07-Apr-2022 12:00:24  Ene Bolaños  Tracheal biopsy 07-Apr-2022 12:00:36  Ene Bolaños

## 2022-04-11 LAB — SURGICAL PATHOLOGY STUDY: SIGNIFICANT CHANGE UP

## 2022-04-21 ENCOUNTER — APPOINTMENT (OUTPATIENT)
Dept: THORACIC SURGERY | Facility: CLINIC | Age: 47
End: 2022-04-21
Payer: MEDICARE

## 2022-04-21 VITALS
HEART RATE: 72 BPM | OXYGEN SATURATION: 96 % | SYSTOLIC BLOOD PRESSURE: 151 MMHG | BODY MASS INDEX: 42.39 KG/M2 | RESPIRATION RATE: 17 BRPM | DIASTOLIC BLOOD PRESSURE: 79 MMHG | WEIGHT: 313 LBS | HEIGHT: 72 IN

## 2022-04-21 PROCEDURE — 99214 OFFICE O/P EST MOD 30 MIN: CPT

## 2022-04-21 NOTE — PHYSICAL EXAM
[Normal Rate] : the respiratory rate was normal [Normal Rhythm/Effort] : normal respiratory rhythm and effort [Decreased Breath Sounds Bilaterally] : breath sounds were diminished over both lungs [Heart Rate And Rhythm] : heart rate was normal and rhythm regular [Heart Sounds] : normal S1 and S2 [Heart Sounds Gallop] : no gallops [Murmurs] : no murmurs [Heart Sounds Pericardial Friction Rub] : no pericardial rub [Examination Of The Chest] : the chest was normal in appearance [Chest Visual Inspection Thoracic Asymmetry] : no chest asymmetry [Diminished Respiratory Excursion] : normal chest expansion

## 2022-04-22 NOTE — HISTORY OF PRESENT ILLNESS
[FreeTextEntry1] : Mr. JAZMYN MURGUIA, 46 year old male, current smoker, w/ hx of HTN, DM2, obesity, COPD/asthma, heart murmur, IMMANUEL on CPAP, who f/u with Dr. Walton for COPD/asthma, referred here today for tracheal stenosis. \par \par CT chest on 11/30/2021:\par - mild upper tracheal stenosis. The segment above the stenosis measures 2.4 x 2.3 cm, the surface area at this level measures 3.2 sq cm. At the level of stenosis it measures 1.2 x 1.3 cm; 1.2 sq cm. Below the stenosis measures 2.1 x 1.7 cm; 2.7 sq cm. \par \par Spirometry on 02/04/2022: FVC 81%, FEV1 87%. \par \par Of note, patient also referred to Dr. Martin (ENT) on 05/12/2022. \par \par Now s/p FB with biopsy of tracheal lesions on 4/7/22. The patient has mild tracheal stricture lasting 4 cm in length below the proximal subglottic region. There is also evidence of bilious tinged secretion that is coating the stricture site. It is approximately 25% narrow and it is quite diffuse.  There is no area that is especially more narrow that would require dilation  with YAG laser at this point. Path revealed fragments of keratinizing stratified squamous epithelium with well developed granular cell layer. \par \par Pt presents today for follow up. Patient denies shortness of breath, cough, chest pain, fever, chills. c/o chest tightness  occasionally.

## 2022-04-22 NOTE — CONSULT LETTER
[Dear  ___] : Dear  [unfilled], [Consult Letter:] : I had the pleasure of evaluating your patient, [unfilled]. [( Thank you for referring [unfilled] for consultation for _____ )] : Thank you for referring [unfilled] for consultation for [unfilled] [Please see my note below.] : Please see my note below. [Consult Closing:] : Thank you very much for allowing me to participate in the care of this patient.  If you have any questions, please do not hesitate to contact me. [Sincerely,] : Sincerely, [FreeTextEntry2] : Dr. Trey Walton (Pulm/Ref) [FreeTextEntry3] : Yfn Perrin MD, MPH \par System Director of Thoracic Surgery \par Director of Comprehensive Lung and Foregut [facility] \par Professor Cardiovascular & Thoracic Surgery \par [employer] at [facility]\par \par

## 2022-04-22 NOTE — ASSESSMENT
[FreeTextEntry1] : Mr. JAZMYN MURGUIA, 46 year old male, current smoker, w/ hx of HTN, DM2, obesity, COPD/asthma, heart murmur, IMMANUEL on CPAP, who f/u with Dr. Walton for COPD/asthma, referred here today for tracheal stenosis. \par \par CT chest on 11/30/2021:\par - mild upper tracheal stenosis. The segment above the stenosis measures 2.4 x 2.3 cm, the surface area at this level measures 3.2 sq cm. At the level of stenosis it measures 1.2 x 1.3 cm; 1.2 sq cm. Below the stenosis measures 2.1 x 1.7 cm; 2.7 sq cm. \par \par Spirometry on 02/04/2022: FVC 81%, FEV1 87%. \par \par Of note, patient also referred to Dr. Martin (ENT) on 05/12/2022. \par \par Now s/p FB with biopsy of tracheal lesions on 4/7/22. The patient has mild tracheal stricture lasting 4 cm in length below the proximal subglottic region. There is also evidence of bilious tinged secretion that is coating the stricture site. It is approximately 25% narrow and it is quite diffuse.  There is no area that is especially more narrow that would require dilation  with YAG laser at this point. Path revealed fragments of keratinizing stratified squamous epithelium with well developed granular cell layer. \par \par I have reviewed the patient's medical records and diagnostic images at time of this office consultation and have made the following recommendation:\par 1. Path reviewed and explained to patient, findings likely GI related, I refer patient to Dr. Caden Mensah for GI work up (24 hr pH study and Manometry)\par 2. Upper GI series\par 3. RTC after all above studies. \par \par \par I personally performed the services described in the documentation, reviewed the documentation recorded by the scribe in my presence and it accurately and completely records my words and actions.\par \par I, Mohini Lim NP, am scribing for and the presence of SANTI Cheung, the following sections HISTORY OF PRESENT ILLNESS, PAST MEDICAL/FAMILY/SOCIAL HISTORY; REVIEW OF SYSTEMS; VITAL SIGNS; PHYSICAL EXAM; DISPOSITION.

## 2022-04-22 NOTE — DATA REVIEWED
[No studies available for review at this time.] : No studies available for review at this time. [FreeTextEntry1] : I have independently reviewed patient's path

## 2022-04-25 ENCOUNTER — NON-APPOINTMENT (OUTPATIENT)
Age: 47
End: 2022-04-25

## 2022-04-26 ENCOUNTER — NON-APPOINTMENT (OUTPATIENT)
Age: 47
End: 2022-04-26

## 2022-04-26 ENCOUNTER — APPOINTMENT (OUTPATIENT)
Dept: GASTROENTEROLOGY | Facility: CLINIC | Age: 47
End: 2022-04-26
Payer: MEDICARE

## 2022-04-26 PROCEDURE — 99442: CPT | Mod: 95

## 2022-05-09 ENCOUNTER — APPOINTMENT (OUTPATIENT)
Dept: ORTHOPEDIC SURGERY | Facility: CLINIC | Age: 47
End: 2022-05-09
Payer: MEDICARE

## 2022-05-09 VITALS
WEIGHT: 305 LBS | SYSTOLIC BLOOD PRESSURE: 163 MMHG | HEIGHT: 72 IN | HEART RATE: 60 BPM | DIASTOLIC BLOOD PRESSURE: 83 MMHG | BODY MASS INDEX: 41.31 KG/M2

## 2022-05-09 PROCEDURE — 99213 OFFICE O/P EST LOW 20 MIN: CPT

## 2022-05-09 RX ORDER — NABUMETONE 750 MG/1
750 TABLET, FILM COATED ORAL
Qty: 60 | Refills: 0 | Status: ACTIVE | COMMUNITY
Start: 2022-03-28 | End: 1900-01-01

## 2022-05-09 NOTE — HISTORY OF PRESENT ILLNESS
[de-identified] : The patient presents for follow up of lower back and right knee pain. He has completed two weeks of PT. He has not noticed much improvement this far. He is taking nabumetone twice daily for pain relief. He has difficulty walking two blocks to the grocery store because of pain and stiffness in his lower back. The pain radiates to the buttocks and thigh.  He complains of persistent pain on the anterolateral aspect of the knee. Recently he has been experiencing stiffness of his right calf when sitting. He denies lower extremity numbness or tingling.

## 2022-05-09 NOTE — DISCUSSION/SUMMARY
[de-identified] : The patient has continued pain in his lumbar spine and in his right knee.  He would benefit from continued physical therapy and NSAIDs.  I have renewed nabumetone.  Medication risks have been reviewed.  He will return in 1 month.

## 2022-05-09 NOTE — PHYSICAL EXAM
[DP] : dorsalis pedis 2+ and symmetric bilaterally [PT] : posterior tibial 2+ and symmetric bilaterally [Normal] : Alert and in no acute distress [Poor Appearance] : well-appearing [Acute Distress] : not in acute distress [Obese] : obese [de-identified] : The patient has no respiratory distress. Mood and affect are normal. The patient is alert and oriented to person, place and time.\par Examination of the lumbar spine demonstrates tenderness to the left of the midline. There is mild muscle spasm. There is no deformity. Lumbar flexion is 90°, right lateral flexion 10° and left lateral flexion 10°. Straight leg raise test is negative. Lower extremity neurologic exam is intact with regard to sensation, motor function and deep tendon reflexes.  Trendelenburg is negative.  There is some stiffness of the left hip but no pain with hip motion.  The patient reports pain with motion of the right knee.  There is mild posterior tenderness.  There is no instability of the ligaments.  Range of motion 0-95 limited by pain.  The calves are soft and nontender.  The skin is intact.  There is no lymphedema.

## 2022-05-13 ENCOUNTER — APPOINTMENT (OUTPATIENT)
Dept: PULMONOLOGY | Facility: CLINIC | Age: 47
End: 2022-05-13
Payer: MEDICARE

## 2022-05-13 VITALS
TEMPERATURE: 97.3 F | WEIGHT: 309 LBS | SYSTOLIC BLOOD PRESSURE: 140 MMHG | DIASTOLIC BLOOD PRESSURE: 78 MMHG | BODY MASS INDEX: 41.85 KG/M2 | HEART RATE: 71 BPM | RESPIRATION RATE: 16 BRPM | OXYGEN SATURATION: 97 % | HEIGHT: 72 IN

## 2022-05-13 PROCEDURE — 99406 BEHAV CHNG SMOKING 3-10 MIN: CPT | Mod: 25

## 2022-05-13 PROCEDURE — 95012 NITRIC OXIDE EXP GAS DETER: CPT

## 2022-05-13 PROCEDURE — 99214 OFFICE O/P EST MOD 30 MIN: CPT | Mod: 25

## 2022-05-13 PROCEDURE — 94729 DIFFUSING CAPACITY: CPT

## 2022-05-13 PROCEDURE — ZZZZZ: CPT

## 2022-05-13 PROCEDURE — 94727 GAS DIL/WSHOT DETER LNG VOL: CPT

## 2022-05-13 PROCEDURE — 94010 BREATHING CAPACITY TEST: CPT

## 2022-05-13 RX ORDER — EMPAGLIFLOZIN AND METFORMIN HYDROCHLORIDE 12.5; 1 MG/1; MG/1
12.5-1 TABLET ORAL
Qty: 180 | Refills: 0 | Status: ACTIVE | COMMUNITY
Start: 2022-03-24

## 2022-05-13 RX ORDER — ALLOPURINOL 300 MG/1
300 TABLET ORAL
Qty: 90 | Refills: 0 | Status: ACTIVE | COMMUNITY
Start: 2022-02-07

## 2022-05-13 RX ORDER — METHOCARBAMOL 750 MG/1
750 TABLET, FILM COATED ORAL
Qty: 100 | Refills: 0 | Status: ACTIVE | COMMUNITY
Start: 2022-03-16

## 2022-05-13 RX ORDER — ALBUTEROL SULFATE 2.5 MG/3ML
(2.5 MG/3ML) SOLUTION RESPIRATORY (INHALATION)
Qty: 900 | Refills: 0 | Status: ACTIVE | COMMUNITY
Start: 2022-03-16

## 2022-05-13 RX ORDER — IPRATROPIUM BROMIDE 0.5 MG/2.5ML
0.02 SOLUTION RESPIRATORY (INHALATION)
Qty: 900 | Refills: 0 | Status: ACTIVE | COMMUNITY
Start: 2022-03-16

## 2022-05-13 RX ORDER — OFLOXACIN 3 MG/ML
0.3 SOLUTION/ DROPS OPHTHALMIC
Qty: 5 | Refills: 0 | Status: ACTIVE | COMMUNITY
Start: 2022-03-16

## 2022-05-13 RX ORDER — METOPROLOL SUCCINATE 25 MG/1
25 TABLET, EXTENDED RELEASE ORAL
Qty: 90 | Refills: 0 | Status: ACTIVE | COMMUNITY
Start: 2022-03-24

## 2022-05-13 NOTE — PHYSICAL EXAM
[No Acute Distress] : no acute distress [Normal Oropharynx] : normal oropharynx [III] : Mallampati Class: III [Normal Appearance] : normal appearance [No Neck Mass] : no neck mass [Normal Rate/Rhythm] : normal rate/rhythm [Normal S1, S2] : normal s1, s2 [No Murmurs] : no murmurs [No Resp Distress] : no resp distress [Clear to Auscultation Bilaterally] : clear to auscultation bilaterally [No Abnormalities] : no abnormalities [Benign] : benign [Normal Gait] : normal gait [No Clubbing] : no clubbing [No Cyanosis] : no cyanosis Paul [No Edema] : no edema [FROM] : FROM [Normal Color/ Pigmentation] : normal color/ pigmentation [No Focal Deficits] : no focal deficits [Oriented x3] : oriented x3 [Normal Affect] : normal affect [TextBox_2] : OW [TextBox_68] : I:E 1:3; Clear  [TextBox_89] : abdominal pain

## 2022-05-13 NOTE — HISTORY OF PRESENT ILLNESS
[FreeTextEntry1] : Mr. Call is a 46 year old male presenting to the office for a follow up visit for asthma, allergic rhinitis, COPD, GERD, nicotine addiction, IMMANUEL, snoring, and shortness of breath. His chief complaint is\par - he notes he will have an appointment with his surgeon since the pain has been there since his surgery. \par - he has the pains near the diaphragm. \par - he notes when you touch the sides and underneath his lower rib there was just pain. \par - his breathing has been okay\par -  no SOB \par - he has been using his CPAP machine \par - he notes he was sent to see an orthopedist ; he is having pains in the lower lumbar and his right knee gave out. \par - he has not been able to walk as much for exercise \par - some voice hoarseness \par - no heartburn lately \par - he has been taking meloxicam twice a day \par - \par - patient denies any headaches, nausea, vomiting, fever, chills, sweats, chest pain, chest pressure, palpitations, coughing, wheezing, fatigue, diarrhea, constipation, dysphagia, myalgias, dizziness, or sour taste in the mouth

## 2022-05-13 NOTE — ADDENDUM
[FreeTextEntry1] : Documented by Giselle Sandra acting as a scribe for Dr. Trey Walton on (05/13/2022).\par \par All medical record entries made by the Scribe were at my, Dr. Trey Walton's, direction and personally dictated by me on (05/13/2022). I have reviewed the chart and agree that the record accurately reflects my personal performance of the history, physical exam, assessment and plan. I have also personally directed, reviewed, and agree with the discharge instructions.\par

## 2022-05-13 NOTE — ASSESSMENT
[FreeTextEntry1] : Mr. Call is a 46 year old male active smoker with a history of obesity, COPD/asthma, heart murmur, IMMANUEL, borderline DM, and HTN coming in for pulmonary re-evaluation for his shortness of breath as he is noncompliant. He is still trying to quit smoking. He is currently s/p acute bronchitis / asthmatic bronchitis 5/2020. (Non-complaint) - smoking still - still sx chest Dx with tracheal stenosis - constant GI symptoms \par \par His shortness of breath is felt to be multifactorial due to:\par -overweight\par -out of shape\par -poor breathing mechanics\par -COPD (rarely active)\par -asthma / tracheal stenosis\par -? underlying cardiac disease (valvular heart disease)\par -sinus congestion\par \par \par problem 1: COPD/asthma (controlled)\par -continue Singulair 10 mg QHS\par  -continue Spiriva respimat  2 qDay \par -continue Qvar 80 2 inhalations BID  \par -continue Proventil PRN and before exercise \par -continue Xopenex (0.3) TID via nebulizer \par \par -s/p blood work for + asthma profile, + food IgE panel, - eosinophil level, - IgE level, - Vitamin D level \par \par Compliance was highly stressed and discussed with the patient the importance of regular inhaler and medication use. \par \par -COPD is a progressive disease and although it can’t be cured , appropriate management can slow its progression, reduce frequency and severity of exacerbations, and improve symptoms and the patient quality of life. Hospitalizations are the greatest contributor to the total COPD costs and account for up to 87% of total COPD related costs. Exacerbations are the main cause of admissions and subsequently account for the 40-75% of COPD costs. Inhaled maintenance therapy reduces the incidence of exacerbations in patients with stable COPD. Incorrect inhaler use and nonadherence are major obstacles to achieving COPD treatment goals. Many COPD patients have challenges (impaired inhalation, limited dexterity, reduced cognition: that limit their ability to correctly use their COPD treatment devices resulting in reduced symptom control. Of most importance is smoking cessation and early intervention with respiratory illnesses and contemplation for pulmonary rehab to enhance quality of life.\par -Asthma is  believed to be caused by inherited (genetic) and environmental factor, but its exact cause is unknown. Asthma may be triggered by allergens, lung infections, or irritants in the air. Asthma triggers are different for each person\par -Inhaler technique reviewed as well as oral hygiene techniques reviewed with patient. Avoidance of cold air, extremes of temperature, rescue inhaler should be used before exercise. Order of medication reviewed with patient. Recommended use of a cool mist humidifier in the bedroom. 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 Martin \par -Recommended CTS evaluation with Dr. Yfn Perrin\par \par problem 2: ? cardiac component\par -recommended cardiac evaluation secondary to history of hx of murmur, diabetes, HTN, smoking, and obesity \par \par problem 3: allergic rhinitis/sinusitis\par - continue Clarinex 5 mg QAM\par -recommended the Sinugator for nasal rinsing\par -followed by Flonase 1 sniff/nostril BID - transition to Xhance 1 sniff BID\par -followed by Astelin 0.15 1 sniff/nostril BID \par \par problem 4: current every day smoker / nicotine addiction - (discussed: 5.13.2022)\par -recommended to taper down cigarettes \par -recommended to try Nicotrol patches\par -Discussed for five minutes with the patient the risks/associations with continued smoking including COPD, emphysema, shortness of breath, renal cancer, bladder cancer, stroke risk, cardiac disease, etc. Smoking cessation was discussed at length and highly encouraged. Various options to aid cessation was discussed including use of Chantix, Nicotrol, nicotine products, laser therapy, hypnosis, Wellbutrin, etc. \par \par problem 5: IMMANUEL (complaint) \par -continue to use the CPAP machine, benefiting, and tolerating it well. \par -recommended to use humidifier and Mouth Kote\par -Sleep apnea is associated with adverse clinical consequences which an affect most organ systems.  Cardiovascular disease risk includes arrhythmias, atrial fibrillation, hypertension, coronary artery disease, and stroke. Metabolic disorders include diabetes type 2, non-alcoholic fatty liver disease. Mood disorder especially depression; and cognitive decline especially in the elderly. Associations with  chronic reflux/Luna’s esophagus some but not all inclusive. \par -Reasons to assess this include arousal consistent with hypopnea; respiratory events most prominent in REM sleep or supine position; therefore sleep staging and body position are important for accurate diagnosis and estimation of AHI.\par -According to the National Heart, Lung and Blood [place], CPAP or continuous positive airway pressure is a treatment that uses mild air pressure to keep breathing airways open. A CPAP machine includes a mask or other device that fits over the nose or nose and mouth. The mask is connected to a machine via the tube through which humidified air is blown. In the cases of obstructive sleep apnea, CPAP can reverse the complete blockages or narrowing of upper airways. Following diagnosis, CPAP machine pressures can be determined by a CPAP titration. Individuals who require CPAP can choose among masks and equipment that meet prescription and maximize comfort. Many become accustomed right away while others could require more time. Problems include uncomfortable masks or air leakage which can be adjusted to optimize compliance. \par \par problem 6: GERD\par -continue Omeprazole 40 mg before breakfast \par -Rule of 2s: avoid eating too much, eating too late, eating too spicy, eating two hours before bed\par -Things to avoid including overeating, spicy foods, tight clothing, eating within three hours of bed, this list is not all inclusive. \par -For treatment of reflux, possible options discussed including diet control, H2 blockers, PPIs, as well as coating motility agents discussed as treatment options. Timing of meals and proximity of last meal to sleep were discussed. If symptoms persist, a formal gastrointestinal evaluation is needed.\par \par problem 7: obesity \par -recommended regular exercise\par -mindful v mindless eating \par -Weight loss, exercise, and diet control were discussed and are highly encouraged. Treatment options were given such as, aqua therapy, and contacting a nutritionist. Recommended to use the elliptical, stationary bike, less use of treadmill.  Obesity is associated with worsening asthma, shortness of breath, and potential for cardiac disease, diabetes, and other underlying medical conditions.\par \par problem 8: poor breathing mechanics\par -Recommended Wim Hof and Buteyko breathing techniques \par -Proper breathing techniques were reviewed with an emphasis of exhalation. Patient instructed to breath in for 1 second and out for four seconds. Patient was encouraged to not talk while walking. \par \par problem 9: r/o allergen profile- rediscussed \par -s/p blood work to include: - IgE level, - eosinophil level, - vitamin D level, + food IgE level, and + allergy panel(prescription re-given)\par \par problem 10: r/o chronic fatigue\par -aside from OSAS other etiologies include thyroid disease, anemia, low testosterone levels, and vitamin deficiencies as well as medication effects. Based on this recommended: CBC, thyroid function test, vitamin D levels, sleep study, and free and total testosterone levels. Dr. Walton went over topic multiple times and discussed for an extensive period of time. \par \par Problem 11: Health Maintenance/COVID19 Precautions: Vaccine hesitant (still)\par -  COVID-19 vaccine recommended\par -COVID-19 vaccine discussed with patient\par - Clean your hands often. Wash your hands often with soap and water for at least 20 seconds, especially after blowing your nose, coughing, or sneezing, or having been in a public place.\par - If soap and water are not available, use a hand  that contains at least 60% alcohol.\par - To the extent possible, avoid touching high-touch surfaces in public places - elevator buttons, door handles, handrails, handshaking with people, etc. Use a tissue or your sleeve to cover your hand or finger if you must touch something.\par - Wash your hands after touching surfaces in public places.\par - Avoid touching your face, nose, eyes, etc.\par - Clean and disinfect your home to remove germs: practice routine cleaning of frequently touched surfaces (for example: tables, doorknobs, light switches, handles, desks, toilets, faucets, sinks & cell phones)\par - Avoid crowds, especially in poorly ventilated spaces. Your risk of exposure to respiratory viruses like COVID-19 may increase in crowded, closed-in settings with little air circulation if\par there are people in the crowd who are sick. All patients are recommended to practice social distancing and stay at least 6 feet away from others.\par - Avoid all non-essential travel including plane trips, and especially avoid embarking on cruise ships.\par -If COVID-19 is spreading in your community, take extra measures to put distance between yourself and other people to further reduce your risk of being exposed to this new virus.\par -Stay home as much as possible.\par - Consider ways of getting food brought to your house through family, social, or commercial networks\par -Be aware that the virus has been known to live in the air up to 3 hours post exposure, cardboard up to 24 hours post exposure, copper up to 4 hours post exposure, steel and plastic up to 2-3 days post exposure. Risk of transmission from these surfaces are affected by many variables.\par \par Immune Support Recommendations:\par -OTC Vitamin C 500mg BID \par -OTC Quercetin 250-500mg BID \par -OTC Zinc 75-100mg per day \par -OTC Melatonin 1or 2mg a night \par -OTC Vitamin D 1-4000mg per day\par -Tonic Water 8oz\par -Recommended to Stay Hydrated (At least a gallon/day)\par \par Asthma and COVID19:\par You need to make sure your asthma is under control. This often requires the use of inhaled corticosteroids (and sometimes oral corticosteroids). Inhaled corticosteroids do not likely reduce your immune system’s ability to fight infections, but oral corticosteroids may. It is important to use the steps above to protect yourself to limit your exposure to any respiratory virus. \par \par problem 12: health maintenance \par -recommended to use Dr. Garcia's Intestinal Formula #1 \par -refused flu shot\par -recommended strep pneumonia vaccines: Prevnar-13 vaccine, followed by Pneumo vaccine 23 one year following\par -recommended early intervention for URIs\par -recommended regular osteoporosis evaluations\par -recommended early dermatological evaluations\par -recommended after the age of 50 to the age of 70, colonoscopy every 5 years \par \par Follow up in 4 months with spirometry and NiOx\par He is encouraged to call with any changes, concerns, or questions.

## 2022-05-13 NOTE — PROCEDURE
[FreeTextEntry1] : Full PFT revealed mild obstructive flows, with a FEV1 of 3.06L, which is 71% of predicted, low normal lung volumes, and a diffusion of 31.5, which is 88% of predicted, with a normal flow volume loop \par  \par Feno was  <5; a normal value being less than 25. Fractional exhaled nitric oxide (FENO) is regarded as a simple, noninvasive method for assessing eosinophilic airway inflammation. Produced by a variety of cells within the lung, nitric oxide (NO) concentrations are generally low in healthy individuals. However, high concentrations of NO appear to be involved in nonspecific host defense mechanisms and chronic inflammatory  diseases such as asthma. The American Thoracic Society (ATS) therefore recommended using FENO to aid in the diagnosis and monitoring of eosinophilic airway inflammation and asthma, and for identifying steroid responsive individuals whose chronic respiratory symptoms may be caused by airway inflammation

## 2022-05-17 ENCOUNTER — APPOINTMENT (OUTPATIENT)
Dept: OTOLARYNGOLOGY | Facility: CLINIC | Age: 47
End: 2022-05-17

## 2022-05-17 VITALS
HEIGHT: 72 IN | SYSTOLIC BLOOD PRESSURE: 158 MMHG | TEMPERATURE: 98 F | DIASTOLIC BLOOD PRESSURE: 69 MMHG | WEIGHT: 307 LBS | HEART RATE: 70 BPM | BODY MASS INDEX: 41.58 KG/M2

## 2022-05-17 PROCEDURE — 31579 LARYNGOSCOPY TELESCOPIC: CPT

## 2022-05-17 PROCEDURE — 99204 OFFICE O/P NEW MOD 45 MIN: CPT | Mod: 25

## 2022-05-17 RX ORDER — FAMOTIDINE 40 MG/1
40 TABLET, FILM COATED ORAL
Qty: 90 | Refills: 1 | Status: ACTIVE | COMMUNITY
Start: 2022-05-17 | End: 1900-01-01

## 2022-05-17 RX ORDER — OMEPRAZOLE 40 MG/1
40 CAPSULE, DELAYED RELEASE ORAL
Qty: 90 | Refills: 1 | Status: ACTIVE | COMMUNITY
Start: 2022-05-17 | End: 1900-01-01

## 2022-05-17 RX ORDER — BUDESONIDE 0.5 MG/2ML
0.5 INHALANT ORAL TWICE DAILY
Qty: 1 | Refills: 2 | Status: ACTIVE | COMMUNITY
Start: 2022-05-17 | End: 1900-01-01

## 2022-05-17 NOTE — CONSULT LETTER
[Dear  ___] : Dear  [unfilled], [Consult Letter:] : I had the pleasure of evaluating your patient, [unfilled]. [Please see my note below.] : Please see my note below. [Consult Closing:] : Thank you very much for allowing me to participate in the care of this patient.  If you have any questions, please do not hesitate to contact me. [Sincerely,] : Sincerely, [FreeTextEntry3] : Colin Martin MD, PhD\par Chief, Division of Laryngology\par Department of Otolaryngology\par [facility]\par Pediatric Otolaryngology, [facility]\par  of Otolaryngology\par [facility]\par \par \par

## 2022-05-17 NOTE — HISTORY OF PRESENT ILLNESS
[de-identified] : 46yM, referred by Dr. Walton for tracheal stenosis. s/p Flexible bronchoscopy with biopsy of tracheal lesions 4/7/22 with Dr. Yfn castillo. Biopsy impression: fragment of keratinizing stratified squamous epithelium with well developed granular cell layer. On CT chest, had about 40% narrowing. \par voice has been stable, slight SOB with activities. \par current smoker, started smoking at the age of 13, w/ hx of HTN, DM2, obesity, COPD/asthma, heart murmur, IMMANUEL on CPAP, who f/u with Dr. Walton for COPD/asthma.\par On anti reflux medication. \par Patient denies otalgia, otorrhea, ear infections, hearing loss, tinnitus, dizziness, vertigo, headaches related to hearing. \par Mild improvemenmt after surgery with laser\par unclear what reflux med he is taking, uses cpap for IMMANUEL\par had previous laryngeal surgery with jose manuel about 5-10 years ago\par \par \par  \par

## 2022-06-06 ENCOUNTER — APPOINTMENT (OUTPATIENT)
Dept: ORTHOPEDIC SURGERY | Facility: CLINIC | Age: 47
End: 2022-06-06

## 2022-06-13 ENCOUNTER — APPOINTMENT (OUTPATIENT)
Dept: ORTHOPEDIC SURGERY | Facility: CLINIC | Age: 47
End: 2022-06-13
Payer: MEDICARE

## 2022-06-13 VITALS
HEIGHT: 72 IN | HEART RATE: 73 BPM | SYSTOLIC BLOOD PRESSURE: 145 MMHG | DIASTOLIC BLOOD PRESSURE: 82 MMHG | WEIGHT: 307 LBS | BODY MASS INDEX: 41.58 KG/M2

## 2022-06-13 DIAGNOSIS — M25.561 PAIN IN RIGHT KNEE: ICD-10-CM

## 2022-06-13 DIAGNOSIS — S33.5XXA SPRAIN OF LIGAMENTS OF LUMBAR SPINE, INITIAL ENCOUNTER: ICD-10-CM

## 2022-06-13 PROCEDURE — 99213 OFFICE O/P EST LOW 20 MIN: CPT

## 2022-07-11 ENCOUNTER — APPOINTMENT (OUTPATIENT)
Dept: ORTHOPEDIC SURGERY | Facility: CLINIC | Age: 47
End: 2022-07-11

## 2022-07-11 VITALS — BODY MASS INDEX: 41.58 KG/M2 | HEIGHT: 72 IN | WEIGHT: 307 LBS

## 2022-07-11 DIAGNOSIS — M54.42 LUMBAGO WITH SCIATICA, LEFT SIDE: ICD-10-CM

## 2022-07-11 DIAGNOSIS — M25.531 PAIN IN RIGHT WRIST: ICD-10-CM

## 2022-07-11 PROCEDURE — 99214 OFFICE O/P EST MOD 30 MIN: CPT

## 2022-07-11 PROCEDURE — 73110 X-RAY EXAM OF WRIST: CPT | Mod: RT

## 2022-07-11 NOTE — PHYSICAL EXAM
[DP] : dorsalis pedis 2+ and symmetric bilaterally [PT] : posterior tibial 2+ and symmetric bilaterally [Normal] : Alert and in no acute distress [Poor Appearance] : well-appearing [Acute Distress] : not in acute distress [Obese] : obese [de-identified] : The patient has no respiratory distress. Mood and affect are normal. The patient is alert and oriented to person, place and time.\par Examination of the lumbar spine demonstrates tenderness to the left of the midline. There is mild muscle spasm. There is no deformity. Lumbar flexion is 90°, right lateral flexion 10° and left lateral flexion 10°. Straight leg raise test is negative. Lower extremity neurologic exam is intact with regard to sensation, motor function and deep tendon reflexes.  Trendelenburg is negative.  There is some stiffness of the left hip but no pain with hip motion.  The patient reports pain with motion of the right knee.  There is mild posterior tenderness.  There is no instability of the ligaments.  Range of motion 0-105 limited by pain.  The calves are soft and nontender.  The skin is intact.  There is no lymphedema.\par Examination of the right wrist demonstrates mild ulnar-sided tenderness.  There is no restriction of motion.  There is mild pain with range of motion.  Tendon function is intact.  Tinel signs are negative.  Durkan's test is negative.  Tendon function is normal. [de-identified] : AP, lateral and oblique x-rays of the right wrist demonstrate no fracture, no dislocation and no bony abnormality.

## 2022-07-11 NOTE — DISCUSSION/SUMMARY
[de-identified] : The patient has an exacerbation of his lumbar spine arthritis and sciatica.  The pathology, natural history and treatment options were discussed.  He will take over-the-counter medication and work on his home strengthening program.  In terms of his right wrist he had a sprain which should resolve with time.  He may take over-the-counter medication as needed.  He will return as needed.

## 2022-07-11 NOTE — REASON FOR VISIT
Patient returned your call.   [Follow-Up Visit] : a follow-up visit for [FreeTextEntry2] : left hip pain and right wrist pain

## 2022-07-11 NOTE — HISTORY OF PRESENT ILLNESS
[de-identified] : 46 year old male presents for worsening left hip pain x 1 week. He complains of constant pain with prolonged sitting and stiffness with walking. He is experiencing numbness and tingling radiating down the left leg. He states nothing has made the pain any better. He is continuing to experience lower back pain, stable since previous visit. He feels his right knee pain has improved. \par \par He also complains of right wrist pain x 4 days. He denies any trauma or injury to the wrist. He experience an intermittent pinching pain in wrist with rotation. He feels the pain has been improving.

## 2022-09-16 ENCOUNTER — APPOINTMENT (OUTPATIENT)
Dept: PULMONOLOGY | Facility: CLINIC | Age: 47
End: 2022-09-16

## 2022-09-16 VITALS
SYSTOLIC BLOOD PRESSURE: 140 MMHG | WEIGHT: 297 LBS | DIASTOLIC BLOOD PRESSURE: 80 MMHG | RESPIRATION RATE: 16 BRPM | TEMPERATURE: 97.7 F | OXYGEN SATURATION: 97 % | BODY MASS INDEX: 40.23 KG/M2 | HEIGHT: 72 IN | HEART RATE: 72 BPM

## 2022-09-16 PROCEDURE — 94010 BREATHING CAPACITY TEST: CPT

## 2022-09-16 PROCEDURE — 99214 OFFICE O/P EST MOD 30 MIN: CPT | Mod: 25

## 2022-09-16 PROCEDURE — 94729 DIFFUSING CAPACITY: CPT

## 2022-09-16 PROCEDURE — 95012 NITRIC OXIDE EXP GAS DETER: CPT

## 2022-09-16 PROCEDURE — 94727 GAS DIL/WSHOT DETER LNG VOL: CPT

## 2022-09-16 PROCEDURE — ZZZZZ: CPT

## 2022-09-16 NOTE — ASSESSMENT
[FreeTextEntry1] : Mr. Call is a 47 year old male active smoker with a history of obesity, COPD/asthma, heart murmur, IMMANUEL, borderline DM, and HTN coming in for pulmonary re-evaluation for his shortness of breath as he is noncompliant. He is still trying to quit smoking. He is s/p acute bronchitis / asthmatic bronchitis 5/2020. (Non-complaint) - smoking still - still sx chest Dx with tracheal stenosis - constant GI symptoms (constipation)\par \par His shortness of breath is felt to be multifactorial due to:\par -overweight\par -out of shape\par -poor breathing mechanics\par -COPD (rarely active)\par -asthma / tracheal stenosis\par -? underlying cardiac disease (valvular heart disease)\par -sinus congestion\par \par \par problem 1: COPD/asthma (controlled)\par -continue Singulair 10 mg QHS\par  -continue Spiriva respimat  2 qDay \par -continue Qvar 80 2 inhalations BID  \par -continue Proventil PRN and before exercise \par -continue Xopenex (0.3) TID via nebulizer \par \par -s/p blood work for + asthma profile, + food IgE panel, - eosinophil level, - IgE level, - Vitamin D level \par \par Compliance was highly stressed and discussed with the patient the importance of regular inhaler and medication use. \par \par -COPD is a progressive disease and although it can’t be cured , appropriate management can slow its progression, reduce frequency and severity of exacerbations, and improve symptoms and the patient quality of life. Hospitalizations are the greatest contributor to the total COPD costs and account for up to 87% of total COPD related costs. Exacerbations are the main cause of admissions and subsequently account for the 40-75% of COPD costs. Inhaled maintenance therapy reduces the incidence of exacerbations in patients with stable COPD. Incorrect inhaler use and nonadherence are major obstacles to achieving COPD treatment goals. Many COPD patients have challenges (impaired inhalation, limited dexterity, reduced cognition: that limit their ability to correctly use their COPD treatment devices resulting in reduced symptom control. Of most importance is smoking cessation and early intervention with respiratory illnesses and contemplation for pulmonary rehab to enhance quality of life.\par -Asthma is  believed to be caused by inherited (genetic) and environmental factor, but its exact cause is unknown. Asthma may be triggered by allergens, lung infections, or irritants in the air. Asthma triggers are different for each person\par -Inhaler technique reviewed as well as oral hygiene techniques reviewed with patient. Avoidance of cold air, extremes of temperature, rescue inhaler should be used before exercise. Order of medication reviewed with patient. Recommended use of a cool mist humidifier in the bedroom. 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 Martin \par -Recommended CTS evaluation with Dr. Yfn Perrin\par \par problem 2: ? cardiac component\par -recommended cardiac evaluation secondary to history of hx of murmur, diabetes, HTN, smoking, and obesity \par \par problem 3: allergic rhinitis/sinusitis\par - continue Clarinex 5 mg QAM\par -recommended the Sinugator for nasal rinsing\par -followed by Flonase 1 sniff/nostril BID - transition to Xhance 1 sniff BID\par -followed by Astelin 0.15 1 sniff/nostril BID \par \par problem 4: current every day smoker / nicotine addiction - (discussed: 9.16.2022)\par -recommended to taper down cigarettes \par -recommended to try Nicotrol patches\par -Discussed for five minutes with the patient the risks/associations with continued smoking including COPD, emphysema, shortness of breath, renal cancer, bladder cancer, stroke risk, cardiac disease, etc. Smoking cessation was discussed at length and highly encouraged. Various options to aid cessation was discussed including use of Chantix, Nicotrol, nicotine products, laser therapy, hypnosis, Wellbutrin, etc. \par \par problem 5: IMMANUEL (complaint) \par -continue to use the CPAP machine, benefiting, and tolerating it well. \par -recommended to use humidifier and Mouth Kote\par -Sleep apnea is associated with adverse clinical consequences which an affect most organ systems.  Cardiovascular disease risk includes arrhythmias, atrial fibrillation, hypertension, coronary artery disease, and stroke. Metabolic disorders include diabetes type 2, non-alcoholic fatty liver disease. Mood disorder especially depression; and cognitive decline especially in the elderly. Associations with  chronic reflux/Luna’s esophagus some but not all inclusive. \par -Reasons to assess this include arousal consistent with hypopnea; respiratory events most prominent in REM sleep or supine position; therefore sleep staging and body position are important for accurate diagnosis and estimation of AHI.\par -According to the National Heart, Lung and Blood [place], CPAP or continuous positive airway pressure is a treatment that uses mild air pressure to keep breathing airways open. A CPAP machine includes a mask or other device that fits over the nose or nose and mouth. The mask is connected to a machine via the tube through which humidified air is blown. In the cases of obstructive sleep apnea, CPAP can reverse the complete blockages or narrowing of upper airways. Following diagnosis, CPAP machine pressures can be determined by a CPAP titration. Individuals who require CPAP can choose among masks and equipment that meet prescription and maximize comfort. Many become accustomed right away while others could require more time. Problems include uncomfortable masks or air leakage which can be adjusted to optimize compliance. \par \par problem 6: GERD\par -recommended Dr. Hobbs for constipation\par -continue Omeprazole 40 mg before breakfast \par -Rule of 2s: avoid eating too much, eating too late, eating too spicy, eating two hours before bed\par -Things to avoid including overeating, spicy foods, tight clothing, eating within three hours of bed, this list is not all inclusive. \par -For treatment of reflux, possible options discussed including diet control, H2 blockers, PPIs, as well as coating motility agents discussed as treatment options. Timing of meals and proximity of last meal to sleep were discussed. If symptoms persist, a formal gastrointestinal evaluation is needed.\par \par problem 7: obesity \par -recommended "10-Day Detox Diet" by Dr. Rihcard Ch \par -recommended regular exercise\par -mindful v mindless eating \par -Weight loss, exercise, and diet control were discussed and are highly encouraged. Treatment options were given such as, aqua therapy, and contacting a nutritionist. Recommended to use the elliptical, stationary bike, less use of treadmill.  Obesity is associated with worsening asthma, shortness of breath, and potential for cardiac disease, diabetes, and other underlying medical conditions.\par \par problem 8: poor breathing mechanics\par -Recommended Wim Hof and Buteyko breathing techniques \par -Proper breathing techniques were reviewed with an emphasis of exhalation. Patient instructed to breath in for 1 second and out for four seconds. Patient was encouraged to not talk while walking. \par \par problem 9: r/o allergen profile- rediscussed \par -s/p blood work to include: - IgE level, - eosinophil level, - vitamin D level, + food IgE level, and + allergy panel(prescription re-given)\par \par problem 10: r/o chronic fatigue\par -aside from OSAS other etiologies include thyroid disease, anemia, low testosterone levels, and vitamin deficiencies as well as medication effects. Based on this recommended: CBC, thyroid function test, vitamin D levels, sleep study, and free and total testosterone levels. Dr. Walton went over topic multiple times and discussed for an extensive period of time. \par \par Problem 11: Health Maintenance/COVID19 Precautions: Vaccine hesitant (still)\par -COVID-19 vaccine discussed with patient\par - Clean your hands often. Wash your hands often with soap and water for at least 20 seconds, especially after blowing your nose, coughing, or sneezing, or having been in a public place.\par - If soap and water are not available, use a hand  that contains at least 60% alcohol.\par - To the extent possible, avoid touching high-touch surfaces in public places - elevator buttons, door handles, handrails, handshaking with people, etc. Use a tissue or your sleeve to cover your hand or finger if you must touch something.\par - Wash your hands after touching surfaces in public places.\par - Avoid touching your face, nose, eyes, etc.\par - Clean and disinfect your home to remove germs: practice routine cleaning of frequently touched surfaces (for example: tables, doorknobs, light switches, handles, desks, toilets, faucets, sinks & cell phones)\par - Avoid crowds, especially in poorly ventilated spaces. Your risk of exposure to respiratory viruses like COVID-19 may increase in crowded, closed-in settings with little air circulation if\par there are people in the crowd who are sick. All patients are recommended to practice social distancing and stay at least 6 feet away from others.\par - Avoid all non-essential travel including plane trips, and especially avoid embarking on cruise ships.\par -If COVID-19 is spreading in your community, take extra measures to put distance between yourself and other people to further reduce your risk of being exposed to this new virus.\par -Stay home as much as possible.\par - Consider ways of getting food brought to your house through family, social, or commercial networks\par -Be aware that the virus has been known to live in the air up to 3 hours post exposure, cardboard up to 24 hours post exposure, copper up to 4 hours post exposure, steel and plastic up to 2-3 days post exposure. Risk of transmission from these surfaces are affected by many variables.\par \par Immune Support Recommendations:\par -OTC Vitamin C 500mg BID \par -OTC Quercetin 250-500mg BID \par -OTC Zinc 75-100mg per day \par -OTC Melatonin 1or 2mg a night \par -OTC Vitamin D 1-4000mg per day\par -Tonic Water 8oz\par -Recommended to Stay Hydrated (At least a gallon/day)\par \par Asthma and COVID19:\par You need to make sure your asthma is under control. This often requires the use of inhaled corticosteroids (and sometimes oral corticosteroids). Inhaled corticosteroids do not likely reduce your immune system’s ability to fight infections, but oral corticosteroids may. It is important to use the steps above to protect yourself to limit your exposure to any respiratory virus. \par \par problem 12: health maintenance \par -recommended to use Dr. Garcia's Intestinal Formula #1 \par -refused flu shot\par -recommended strep pneumonia vaccines: Prevnar-13 vaccine, followed by Pneumo vaccine 23 one year following\par -recommended early intervention for URIs\par -recommended regular osteoporosis evaluations\par -recommended early dermatological evaluations\par -recommended after the age of 50 to the age of 70, colonoscopy every 5 years \par \par Follow up in 4 months with spirometry and NiOx\par He is encouraged to call with any changes, concerns, or questions.

## 2022-09-16 NOTE — PHYSICAL EXAM
[No Acute Distress] : no acute distress [Normal Oropharynx] : normal oropharynx [III] : Mallampati Class: III [Normal Appearance] : normal appearance [No Neck Mass] : no neck mass [Normal Rate/Rhythm] : normal rate/rhythm [Normal S1, S2] : normal s1, s2 [No Murmurs] : no murmurs [No Resp Distress] : no resp distress [Clear to Auscultation Bilaterally] : clear to auscultation bilaterally [No Abnormalities] : no abnormalities [Benign] : benign [Normal Gait] : normal gait [No Clubbing] : no clubbing [No Cyanosis] : no cyanosis [FROM] : FROM [Normal Color/ Pigmentation] : normal color/ pigmentation [No Focal Deficits] : no focal deficits [Oriented x3] : oriented x3 [Normal Affect] : normal affect [TextBox_2] : OW [TextBox_68] : I:E 1:3; Clear  [TextBox_89] : abdominal discomfort around surgical site [TextBox_105] : chronic venous stasis changes

## 2022-09-16 NOTE — ADDENDUM
[FreeTextEntry1] : Documented by NICK Abdul acting as a scribe for Dr. Trey Walton on 09/16/2022 .\par All medical record entries made by the Scribe were at my, Dr. Trey Walton's, direction and personally dictated by me on 09/16/2022. I have reviewed the chart and agree that the record accurately reflects my personal performance of the history, physical exam, assessment and plan. I have also personally directed, reviewed, and agree with the discharge instructions. \par

## 2022-09-16 NOTE — PROCEDURE
[FreeTextEntry1] : Full PFT reveals normal flows; FEV1 was 4.06L which is 94% of predicted; normal lung volumes; normal diffusion at 36.2, which is 104% of predicted; normal flow volume loop. \par \par FENO was <5; a normal value being less than 25\par Fractional exhaled nitric oxide (FENO) is regarded as a simple, noninvasive method for assessing eosinophilic airway inflammation. Produced by a variety of cells within the lung, nitric oxide (NO) concentrations are generally low in healthy individuals. However, high concentrations of NO appear to be involved in nonspecific host defense mechanisms and chronic inflammatory diseases such as asthma. The American Thoracic Society (ATS) therefore has recommended using FENO to aid in the diagnosis and monitoring of eosinophilic airway inflammation and asthma, and for identifying steroid responsive individuals whose chronic respiratory symptoms may be caused by airway inflammation.

## 2022-09-16 NOTE — HISTORY OF PRESENT ILLNESS
[FreeTextEntry1] : Mr. Call is a 47 year old male presenting to the office for a follow up visit for asthma, allergic rhinitis, COPD, GERD, nicotine addiction, IMMANUEL, snoring, and shortness of breath. His chief complaint is\par \par -he notes abdominal discomfort s/p abdominal surgery\par -he notes umbilical hernia mesh broke\par -he notes severe abdominal burning sensation\par -he notes persistent constipation since surgery\par -he notes sleeping for 7hrs\par -he notes compliant with CPAP\par -he denies dysphonia \par -he notes SOB\par -he notes increased smoking due to stress\par -he notes congested sinuses\par -he notes warm air improves sinuses\par \par -he denies any headaches, nausea, vomiting, fever, chills, sweats, coughing, wheezing, palpitations, diarrhea, dizziness, dysphagia, heartburn, reflux, itchy eyes, itchy ears, leg swelling, leg pain, arthralgias, myalgias, or sour taste in the mouth.

## 2022-09-28 ENCOUNTER — APPOINTMENT (OUTPATIENT)
Dept: OTOLARYNGOLOGY | Facility: CLINIC | Age: 47
End: 2022-09-28

## 2023-01-23 ENCOUNTER — APPOINTMENT (OUTPATIENT)
Dept: PULMONOLOGY | Facility: CLINIC | Age: 48
End: 2023-01-23

## 2023-05-02 ENCOUNTER — APPOINTMENT (OUTPATIENT)
Dept: PULMONOLOGY | Facility: CLINIC | Age: 48
End: 2023-05-02
Payer: MEDICARE

## 2023-05-02 ENCOUNTER — NON-APPOINTMENT (OUTPATIENT)
Age: 48
End: 2023-05-02

## 2023-05-02 VITALS
WEIGHT: 296 LBS | DIASTOLIC BLOOD PRESSURE: 80 MMHG | HEART RATE: 72 BPM | SYSTOLIC BLOOD PRESSURE: 140 MMHG | OXYGEN SATURATION: 96 % | BODY MASS INDEX: 40.09 KG/M2 | TEMPERATURE: 98 F | HEIGHT: 72 IN | RESPIRATION RATE: 17 BRPM

## 2023-05-02 PROCEDURE — 94010 BREATHING CAPACITY TEST: CPT

## 2023-05-02 PROCEDURE — 99406 BEHAV CHNG SMOKING 3-10 MIN: CPT | Mod: 25

## 2023-05-02 PROCEDURE — 99214 OFFICE O/P EST MOD 30 MIN: CPT | Mod: 25

## 2023-05-02 RX ORDER — TIOTROPIUM BROMIDE AND OLODATEROL 3.124; 2.736 UG/1; UG/1
2.5-2.5 SPRAY, METERED RESPIRATORY (INHALATION) DAILY
Qty: 3 | Refills: 1 | Status: ACTIVE | COMMUNITY
Start: 2023-05-02 | End: 1900-01-01

## 2023-05-02 NOTE — PHYSICAL EXAM
[No Acute Distress] : no acute distress [Normal Oropharynx] : normal oropharynx [III] : Mallampati Class: III [Normal Appearance] : normal appearance [No Neck Mass] : no neck mass [Normal Rate/Rhythm] : normal rate/rhythm [Normal S1, S2] : normal s1, s2 [No Murmurs] : no murmurs [No Resp Distress] : no resp distress [Clear to Auscultation Bilaterally] : clear to auscultation bilaterally [No Abnormalities] : no abnormalities [Benign] : benign [Normal Gait] : normal gait [No Clubbing] : no clubbing [No Cyanosis] : no cyanosis [FROM] : FROM [Normal Color/ Pigmentation] : normal color/ pigmentation [No Focal Deficits] : no focal deficits [Oriented x3] : oriented x3 [Normal Affect] : normal affect [TextBox_2] : OW [TextBox_68] : I:E 1:3; Clear

## 2023-05-02 NOTE — HISTORY OF PRESENT ILLNESS
[FreeTextEntry1] : Mr. Call is a 47 year old male presenting to the office for a follow up visit for asthma, allergic rhinitis, COPD, GERD, nicotine addiction, IMMANUEL, snoring, and shortness of breath. His chief complaint is\par \par -he notes not feeling well since hernia surgery\par -he notes being in constant pain since hernia surgery\par -he notes pain in his sides and ribs since surgery\par -he notes his hernia bulges significantly when getting up from bed\par -he notes that his bowels are irregular \par -he notes constipation \par -he notes smoking 6 cigarette per day\par -he notes his weight is stable (298 lbs)\par -he notes no issues with breathing\par -he notes his sinuses are problematic at the moment \par -he notes nasal sprays do not work for him\par -he notes regular CPAP use \par \par -patient denies any headaches, nausea, vomiting, fever, chills, sweats, wheezing, diarrhea, dysphagia, dizziness, leg swelling

## 2023-05-02 NOTE — PROCEDURE
[FreeTextEntry1] : PFT revealed normal flows, with a FEV1 of 3.47L, which is 83% of predicted, with a normal flow volume loop. -PFTs performed today for evaluation of asthma (05/02/2023)

## 2023-05-02 NOTE — ADDENDUM
[FreeTextEntry1] : Documented by Huber Gomes acting as a scribe for Dr. Trey Walton on 05/02/2023.\par \par All medical record entries made by the Scribe were at my, Dr. Trey Walton's, direction and personally dictated by me on 05/02/2023. I have reviewed the chart and agree that the record accurately reflects my personal performance of the history, physical exam, assessment and plan. I have also personally directed, reviewed, and agree with the discharge instructions.

## 2023-05-02 NOTE — ASSESSMENT
[FreeTextEntry1] : Mr. Call is a 47 year old male active smoker with a history of obesity, COPD/asthma, heart murmur, IMMANUEL, borderline DM, and HTN coming in for pulmonary re-evaluation for his shortness of breath as he is noncompliant. He is still trying to quit smoking. He is s/p acute bronchitis / asthmatic bronchitis 5/2020. (Non-complaint) - smoking still - still sx chest Dx with tracheal stenosis - constant GI symptoms (constipation)/ abdominal pain post hernia surgery\par \par His shortness of breath is felt to be multifactorial due to:\par -overweight\par -out of shape\par -poor breathing mechanics\par -COPD (rarely active)\par -asthma / tracheal stenosis\par -? underlying cardiac disease (valvular heart disease)\par -sinus congestion\par \par \par problem 1: COPD/asthma (controlled)\par -continue Singulair 10 mg QHS to Stiolto 2 Puffs BID \par  -transition Spiriva respimat  2 qDay \par -continue Qvar 80 2 inhalations BID  \par -continue Proventil PRN and before exercise \par -continue Xopenex (0.3) TID via nebulizer \par \par -s/p blood work for + asthma profile, + food IgE panel, - eosinophil level, - IgE level, - Vitamin D level \par \par Compliance was highly stressed and discussed with the patient the importance of regular inhaler and medication use. \par \par -COPD is a progressive disease and although it can’t be cured , appropriate management can slow its progression, reduce frequency and severity of exacerbations, and improve symptoms and the patient quality of life. Hospitalizations are the greatest contributor to the total COPD costs and account for up to 87% of total COPD related costs. Exacerbations are the main cause of admissions and subsequently account for the 40-75% of COPD costs. Inhaled maintenance therapy reduces the incidence of exacerbations in patients with stable COPD. Incorrect inhaler use and nonadherence are major obstacles to achieving COPD treatment goals. Many COPD patients have challenges (impaired inhalation, limited dexterity, reduced cognition: that limit their ability to correctly use their COPD treatment devices resulting in reduced symptom control. Of most importance is smoking cessation and early intervention with respiratory illnesses and contemplation for pulmonary rehab to enhance quality of life.\par -Asthma is  believed to be caused by inherited (genetic) and environmental factor, but its exact cause is unknown. Asthma may be triggered by allergens, lung infections, or irritants in the air. Asthma triggers are different for each person\par -Inhaler technique reviewed as well as oral hygiene techniques reviewed with patient. Avoidance of cold air, extremes of temperature, rescue inhaler should be used before exercise. Order of medication reviewed with patient. Recommended use of a cool mist humidifier in the bedroom. 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 Martin \par -Recommended CTS evaluation with Dr. Yfn Perrin\par \par problem 2: ? cardiac component\par -recommended cardiac evaluation secondary to history of hx of murmur, diabetes, HTN, smoking, and obesity \par \par problem 3: allergic rhinitis/sinusitis (persistent Sx)\par -complete sinus CT then f/u with ENT\par - continue Clarinex 5 mg QAM\par -recommended the Sinugator for nasal rinsing\par -followed by Flonase 1 sniff/nostril BID - transition to Xhance 1 sniff BID\par -followed by Astelin 0.15 1 sniff/nostril BID \par \par problem 4: current every day smoker / nicotine addiction - (discussed: 5/2/23)\par -recommended to taper down cigarettes \par -recommended to try Nicotrol patches\par -Discussed for five minutes with the patient the risks/associations with continued smoking including COPD, emphysema, shortness of breath, renal cancer, bladder cancer, stroke risk, cardiac disease, etc. Smoking cessation was discussed at length and highly encouraged. Various options to aid cessation was discussed including use of Chantix, Nicotrol, nicotine products, laser therapy, hypnosis, Wellbutrin, etc. \par \par problem 5: IMMANUEL (compliant)\par -continue to use the CPAP machine, benefiting, and tolerating it well. \par -recommended to use humidifier and Mouth Kote\par -Sleep apnea is associated with adverse clinical consequences which an affect most organ systems.  Cardiovascular disease risk includes arrhythmias, atrial fibrillation, hypertension, coronary artery disease, and stroke. Metabolic disorders include diabetes type 2, non-alcoholic fatty liver disease. Mood disorder especially depression; and cognitive decline especially in the elderly. Associations with  chronic reflux/Luna’s esophagus some but not all inclusive. \par -Reasons to assess this include arousal consistent with hypopnea; respiratory events most prominent in REM sleep or supine position; therefore sleep staging and body position are important for accurate diagnosis and estimation of AHI.\par -According to the National Heart, Lung and Blood [place], CPAP or continuous positive airway pressure is a treatment that uses mild air pressure to keep breathing airways open. A CPAP machine includes a mask or other device that fits over the nose or nose and mouth. The mask is connected to a machine via the tube through which humidified air is blown. In the cases of obstructive sleep apnea, CPAP can reverse the complete blockages or narrowing of upper airways. Following diagnosis, CPAP machine pressures can be determined by a CPAP titration. Individuals who require CPAP can choose among masks and equipment that meet prescription and maximize comfort. Many become accustomed right away while others could require more time. Problems include uncomfortable masks or air leakage which can be adjusted to optimize compliance. \par \par problem 6: GERD\par -recommended Dr. Ahmadi for constipation\par -continue Omeprazole 40 mg before breakfast \par -Rule of 2s: avoid eating too much, eating too late, eating too spicy, eating two hours before bed\par -Things to avoid including overeating, spicy foods, tight clothing, eating within three hours of bed, this list is not all inclusive. \par -For treatment of reflux, possible options discussed including diet control, H2 blockers, PPIs, as well as coating motility agents discussed as treatment options. Timing of meals and proximity of last meal to sleep were discussed. If symptoms persist, a formal gastrointestinal evaluation is needed.\par \par problem 7: obesity (290+)\par -recommended "10-Day Detox Diet" by Dr. Richard Ch \par -recommended regular exercise\par -mindful v mindless eating \par -Weight loss, exercise, and diet control were discussed and are highly encouraged. Treatment options were given such as, aqua therapy, and contacting a nutritionist. Recommended to use the elliptical, stationary bike, less use of treadmill.  Obesity is associated with worsening asthma, shortness of breath, and potential for cardiac disease, diabetes, and other underlying medical conditions.\par \par problem 8: poor breathing mechanics\par -Recommended Wim Hof and Buteyko breathing techniques \par -Proper breathing techniques were reviewed with an emphasis of exhalation. Patient instructed to breath in for 1 second and out for four seconds. Patient was encouraged to not talk while walking. \par \par problem 9: r/o allergen profile- rediscussed \par -s/p blood work to include: - IgE level, - eosinophil level, - vitamin D level, + food IgE level, and + allergy panel(prescription re-given)\par \par problem 10: r/o chronic fatigue\par -aside from OSAS other etiologies include thyroid disease, anemia, low testosterone levels, and vitamin deficiencies as well as medication effects. Based on this recommended: CBC, thyroid function test, vitamin D levels, sleep study, and free and total testosterone levels. Dr. Walton went over topic multiple times and discussed for an extensive period of time. \par \par Problem 11: Health Maintenance/COVID19 Precautions: Vaccine hesitant (still)\par -COVID-19 vaccine discussed with patient\par Immune Support Recommendations:\par -OTC Vitamin C 500mg BID \par -OTC Quercetin 250-500mg BID \par -OTC Zinc 75-100mg per day \par -OTC Melatonin 1or 2mg a night \par -OTC Vitamin D 1-4000mg per day\par -Tonic Water 8oz\par -Recommended to Stay Hydrated (At least a gallon/day)\par \par Asthma and COVID19:\par You need to make sure your asthma is under control. This often requires the use of inhaled corticosteroids (and sometimes oral corticosteroids). Inhaled corticosteroids do not likely reduce your immune system’s ability to fight infections, but oral corticosteroids may. It is important to use the steps above to protect yourself to limit your exposure to any respiratory virus. \par \par problem 12: health maintenance \par -recommended to use Dr. Garcia's Intestinal Formula #1 \par -refused flu shot\par -recommended strep pneumonia vaccines: Prevnar-13 vaccine, followed by Pneumo vaccine 23 one year following\par -recommended early intervention for URIs\par -recommended regular osteoporosis evaluations\par -recommended early dermatological evaluations\par -recommended after the age of 50 to the age of 70, colonoscopy every 5 years \par \par Follow up in 4 months with spirometry and NiOx\par He is encouraged to call with any changes, concerns, or questions.

## 2023-09-22 ENCOUNTER — APPOINTMENT (OUTPATIENT)
Dept: PULMONOLOGY | Facility: CLINIC | Age: 48
End: 2023-09-22
Payer: MEDICARE

## 2023-09-22 VITALS
RESPIRATION RATE: 17 BRPM | HEIGHT: 72 IN | WEIGHT: 300 LBS | DIASTOLIC BLOOD PRESSURE: 60 MMHG | BODY MASS INDEX: 40.63 KG/M2 | TEMPERATURE: 98.1 F | SYSTOLIC BLOOD PRESSURE: 132 MMHG | OXYGEN SATURATION: 98 % | HEART RATE: 108 BPM

## 2023-09-22 DIAGNOSIS — G47.33 OBSTRUCTIVE SLEEP APNEA (ADULT) (PEDIATRIC): ICD-10-CM

## 2023-09-22 DIAGNOSIS — F17.200 NICOTINE DEPENDENCE, UNSPECIFIED, UNCOMPLICATED: ICD-10-CM

## 2023-09-22 DIAGNOSIS — Z99.89 OBSTRUCTIVE SLEEP APNEA (ADULT) (PEDIATRIC): ICD-10-CM

## 2023-09-22 DIAGNOSIS — E66.3 OVERWEIGHT: ICD-10-CM

## 2023-09-22 PROCEDURE — 94727 GAS DIL/WSHOT DETER LNG VOL: CPT

## 2023-09-22 PROCEDURE — 94729 DIFFUSING CAPACITY: CPT

## 2023-09-22 PROCEDURE — 95012 NITRIC OXIDE EXP GAS DETER: CPT

## 2023-09-22 PROCEDURE — 99214 OFFICE O/P EST MOD 30 MIN: CPT | Mod: 25

## 2023-09-22 PROCEDURE — 94010 BREATHING CAPACITY TEST: CPT

## 2024-03-22 ENCOUNTER — APPOINTMENT (OUTPATIENT)
Dept: PULMONOLOGY | Facility: CLINIC | Age: 49
End: 2024-03-22
Payer: MEDICARE

## 2024-03-22 VITALS
HEART RATE: 83 BPM | OXYGEN SATURATION: 95 % | DIASTOLIC BLOOD PRESSURE: 60 MMHG | SYSTOLIC BLOOD PRESSURE: 132 MMHG | TEMPERATURE: 97.2 F | HEIGHT: 72 IN | BODY MASS INDEX: 39.28 KG/M2 | WEIGHT: 290 LBS | RESPIRATION RATE: 16 BRPM

## 2024-03-22 DIAGNOSIS — J30.9 ALLERGIC RHINITIS, UNSPECIFIED: ICD-10-CM

## 2024-03-22 DIAGNOSIS — J45.909 UNSPECIFIED ASTHMA, UNCOMPLICATED: ICD-10-CM

## 2024-03-22 DIAGNOSIS — K21.9 GASTRO-ESOPHAGEAL REFLUX DISEASE W/OUT ESOPHAGITIS: ICD-10-CM

## 2024-03-22 DIAGNOSIS — J44.9 CHRONIC OBSTRUCTIVE PULMONARY DISEASE, UNSPECIFIED: ICD-10-CM

## 2024-03-22 DIAGNOSIS — R06.83 SNORING: ICD-10-CM

## 2024-03-22 DIAGNOSIS — R06.02 SHORTNESS OF BREATH: ICD-10-CM

## 2024-03-22 DIAGNOSIS — J39.8 OTHER SPECIFIED DISEASES OF UPPER RESPIRATORY TRACT: ICD-10-CM

## 2024-03-22 PROCEDURE — 94010 BREATHING CAPACITY TEST: CPT

## 2024-03-22 PROCEDURE — 99406 BEHAV CHNG SMOKING 3-10 MIN: CPT | Mod: 25

## 2024-03-22 PROCEDURE — 99214 OFFICE O/P EST MOD 30 MIN: CPT | Mod: 25

## 2024-03-22 PROCEDURE — 95012 NITRIC OXIDE EXP GAS DETER: CPT

## 2024-03-22 PROCEDURE — ZZZZZ: CPT

## 2024-03-22 PROCEDURE — 94729 DIFFUSING CAPACITY: CPT

## 2024-03-22 PROCEDURE — 94727 GAS DIL/WSHOT DETER LNG VOL: CPT

## 2024-03-22 RX ORDER — TIOTROPIUM BROMIDE INHALATION SPRAY 3.12 UG/1
2.5 SPRAY, METERED RESPIRATORY (INHALATION) DAILY
Qty: 3 | Refills: 1 | Status: ACTIVE | COMMUNITY
Start: 2024-03-22 | End: 1900-01-01

## 2024-03-22 NOTE — HISTORY OF PRESENT ILLNESS
[FreeTextEntry1] : Mr. Call is a 48 year old male presenting to the office for a follow up visit for asthma, allergic rhinitis, COPD, GERD, nicotine addiction, IMMANUEL, snoring, and shortness of breath. His chief complaint is  -he notes bloating and abdominal pain possible due to a hiatal hernia -he notes one incident of palpitations  -no new medications, vitamins, or supplements -he notes Nasal congestion -he notes sleeping (5-6 hours) using CPAP -he notes some mild dysphonia -he notes his weight is stable  -he notes not exercising -he notes a productive cough with white sputum -He notes smoking 4x packs a week -he notes stress and anxiety as of lately -he notes using Ventolin and Symbicort   -Patient denies any headaches, nausea, vomiting, fever, chills, sweats, chest pain, chest pressure, wheezing, fatigue, diarrhea, constipation dysphagia, arthralgias, myalgias, dizziness, leg swelling, leg pain, itchy eyes, itchy ears, heartburn, reflux or sour taste in mouth.

## 2024-03-22 NOTE — ASSESSMENT
[FreeTextEntry1] : Mr. Call is a 48 year old male active smoker with a history of obesity, COPD/asthma, heart murmur, IMMANUEL, borderline DM, and HTN coming in for pulmonary re-evaluation for his shortness of breath as he is noncompliant. He is still trying to quit smoking. He is s/p acute bronchitis / asthmatic bronchitis 5/2020. (Non-complaint) - smoking still - still sx chest Dx with tracheal stenosis - smoking still/weight issues - "chronic cough"  His shortness of breath is felt to be multifactorial due to: -overweight -out of shape -poor breathing mechanics -COPD (rarely active) -asthma / tracheal stenosis -? underlying cardiac disease (valvular heart disease) -sinus congestion   problem 1: COPD/asthma (Active due to smoking) -continue Singulair 10 mg QHS  -Add Symbicort 160 2 inhalations BID -Add Spiriva 2 inhalations QD   -continue Proventil PRN and before exercise  -continue Xopenex (0.3) TID via nebulizer   -s/p blood work for + asthma profile, + food IgE panel, - eosinophil level, - IgE level, - Vitamin D level   Compliance was highly stressed and discussed with the patient the importance of regular inhaler and medication use.   -COPD is a progressive disease and although it can't be cured , appropriate management can slow its progression, reduce frequency and severity of exacerbations, and improve symptoms and the patient quality of life. Hospitalizations are the greatest contributor to the total COPD costs and account for up to 87% of total COPD related costs. Exacerbations are the main cause of admissions and subsequently account for the 40-75% of COPD costs. Inhaled maintenance therapy reduces the incidence of exacerbations in patients with stable COPD. Incorrect inhaler use and nonadherence are major obstacles to achieving COPD treatment goals. Many COPD patients have challenges (impaired inhalation, limited dexterity, reduced cognition: that limit their ability to correctly use their COPD treatment devices resulting in reduced symptom control. Of most importance is smoking cessation and early intervention with respiratory illnesses and contemplation for pulmonary rehab to enhance quality of life. -Asthma is  believed to be caused by inherited (genetic) and environmental factor, but its exact cause is unknown. Asthma may be triggered by allergens, lung infections, or irritants in the air. Asthma triggers are different for each person -Inhaler technique reviewed as well as oral hygiene techniques reviewed with patient. Avoidance of cold air, extremes of temperature, rescue inhaler should be used before exercise. Order of medication reviewed with patient. Recommended use of a cool mist humidifier in the bedroom.   Problem 1A: tracheal stenosis -Recommended evaluation with ENT with Dr. Martin  -Recommended CTS evaluation with Dr. Yfn Perrin  problem 2: ? cardiac component -recommended cardiac evaluation secondary to history of hx of murmur, diabetes, HTN, smoking, and obesity   problem 3: allergic rhinitis/sinusitis (persistent Sx) -complete sinus CT then f/u with ENT - continue Clarinex 5 mg QAM -recommended the Sinugator for nasal rinsing -followed by Flonase 1 sniff/nostril BID - transition to Xhance 1 sniff BID -followed by Astelin 0.15 1 sniff/nostril BID   problem 4: current every day smoker / nicotine addiction - (discussed: 3/2024) -recommended to taper down cigarettes  -recommended to try Nicotrol patches -Discussed for five minutes with the patient the risks/associations with continued smoking including COPD, emphysema, shortness of breath, renal cancer, bladder cancer, stroke risk, cardiac disease, etc. Smoking cessation was discussed at length and highly encouraged. Various options to aid cessation was discussed including use of Chantix, Nicotrol, nicotine products, laser therapy, hypnosis, Wellbutrin, etc.   problem 5: IMMANUEL (compliant) -continue to use the CPAP machine, benefiting, and tolerating it well.  -recommended to use humidifier and Mouth Kote -Sleep apnea is associated with adverse clinical consequences which an affect most organ systems.  Cardiovascular disease risk includes arrhythmias, atrial fibrillation, hypertension, coronary artery disease, and stroke. Metabolic disorders include diabetes type 2, non-alcoholic fatty liver disease. Mood disorder especially depression; and cognitive decline especially in the elderly. Associations with  chronic reflux/Luna's esophagus some but not all inclusive.  -Reasons to assess this include arousal consistent with hypopnea; respiratory events most prominent in REM sleep or supine position; therefore sleep staging and body position are important for accurate diagnosis and estimation of AHI. -According to the National Heart, Lung and Blood [place], CPAP or continuous positive airway pressure is a treatment that uses mild air pressure to keep breathing airways open. A CPAP machine includes a mask or other device that fits over the nose or nose and mouth. The mask is connected to a machine via the tube through which humidified air is blown. In the cases of obstructive sleep apnea, CPAP can reverse the complete blockages or narrowing of upper airways. Following diagnosis, CPAP machine pressures can be determined by a CPAP titration. Individuals who require CPAP can choose among masks and equipment that meet prescription and maximize comfort. Many become accustomed right away while others could require more time. Problems include uncomfortable masks or air leakage which can be adjusted to optimize compliance.   problem 6: GERD-quiet  -recommended Dr. Hobbs' for constipation-resolved  -continue Omeprazole 40 mg before breakfast  -Rule of 2s: avoid eating too much, eating too late, eating too spicy, eating two hours before bed -Things to avoid including overeating, spicy foods, tight clothing, eating within three hours of bed, this list is not all inclusive.  -For treatment of reflux, possible options discussed including diet control, H2 blockers, PPIs, as well as coating motility agents discussed as treatment options. Timing of meals and proximity of last meal to sleep were discussed. If symptoms persist, a formal gastrointestinal evaluation is needed.  problem 7: obesity (290+) -recommended "10-Day Detox Diet" by Dr. Richard Ch  -recommended regular exercise -mindful v mindless eating  -Weight loss, exercise, and diet control were discussed and are highly encouraged. Treatment options were given such as, aqua therapy, and contacting a nutritionist. Recommended to use the elliptical, stationary bike, less use of treadmill.  Obesity is associated with worsening asthma, shortness of breath, and potential for cardiac disease, diabetes, and other underlying medical conditions.  problem 8: poor breathing mechanics -Recommended Wim Hof and Buteyko breathing techniques  -Proper breathing techniques were reviewed with an emphasis of exhalation. Patient instructed to breath in for 1 second and out for four seconds. Patient was encouraged to not talk while walking.   problem 9: r/o allergen profile- rediscussed  -s/p blood work to include: - IgE level, - eosinophil level, - vitamin D level, + food IgE level, and + allergy panel(prescription re-given)  problem 10: r/o chronic fatigue -aside from OSAS other etiologies include thyroid disease, anemia, low testosterone levels, and vitamin deficiencies as well as medication effects. Based on this recommended: CBC, thyroid function test, vitamin D levels, sleep study, and free and total testosterone levels. Dr. Walton went over topic multiple times and discussed for an extensive period of time.   Problem 11: Health Maintenance/COVID19 Precautions: Vaccine hesitant (still) -COVID-19 vaccine discussed with patient Immune Support Recommendations: -OTC Vitamin C 500mg BID  -OTC Quercetin 250-500mg BID  -OTC Zinc 75-100mg per day  -OTC Melatonin 1or 2mg a night  -OTC Vitamin D 1-4000mg per day -Tonic Water 8oz -Recommended to Stay Hydrated (At least a gallon/day)  Asthma and COVID19: You need to make sure your asthma is under control. This often requires the use of inhaled corticosteroids (and sometimes oral corticosteroids). Inhaled corticosteroids do not likely reduce your immune system's ability to fight infections, but oral corticosteroids may. It is important to use the steps above to protect yourself to limit your exposure to any respiratory virus.   problem 12: health maintenance  -recommended to use Dr. Garcia's Intestinal Formula #1  -refused flu shot -recommended strep pneumonia vaccines: Prevnar-13 vaccine, followed by Pneumo vaccine 23 one year following -recommended early intervention for URIs -recommended regular osteoporosis evaluations -recommended early dermatological evaluations -recommended after the age of 50 to the age of 70, colonoscopy every 5 years   Follow up in 4 months with spirometry and NiOx He is encouraged to call with any changes, concerns, or questions.

## 2024-03-22 NOTE — ADDENDUM
[FreeTextEntry1] :  Documented by Janene Horne acting as a scribe for Dr. Trey Walton on 03/22/2024 .   All medical record entries made by the Scribe were at my, Dr. Trey Walton's direction and personally dictated by me on 03/22/2024 . I have reviewed the chart and agree that the record accurately reflects my personal performance of the history, Physical exam, assessment, and plan. I have also personally directed, reviewed, and agree with the discharge instructions.

## 2024-03-22 NOTE — PROCEDURE
[FreeTextEntry1] :  Full PFT reveals normal flows; FEV1 was 4.26 L which is  100% of predicted, normal lung volumes, normal diffusions, at 37.9 L which is 111% predicted, aborted inspiratory limb. PFT's for performed to evaluate for COPD and SOB.   FENO was 6 ; a normal value being less than 25Fractional exhaled nitric oxide (FENO) is regarded as a simple, noninvasive method for assessing eosinophilic airway inflammation. Produced by a variety of cells within the lung, nitric oxide (NO) concentrations are generally low in healthy individuals. However, high concentrations of NO appear to be involved in nonspecific host defense mechanisms and chronic inflammatory diseases such as asthma. The American Thoracic Society (ATS) therefore has strongly recommended using FENO to aid in the assessment, management, and long-term monitoring of eosinophilic airway inflammation and asthma, and for identifying steroid responsive individuals whose chronic respiratory symptoms may be caused by airway inflammation. In their 2011 clinical practice guideline, the ATS emphasizes the importance of using FENO.

## 2024-03-22 NOTE — PHYSICAL EXAM
[No Acute Distress] : no acute distress [Normal Oropharynx] : normal oropharynx [III] : Mallampati Class: III [Normal Appearance] : normal appearance [No Neck Mass] : no neck mass [Normal Rate/Rhythm] : normal rate/rhythm [Normal S1, S2] : normal s1, s2 [No Murmurs] : no murmurs [Clear to Auscultation Bilaterally] : clear to auscultation bilaterally [No Resp Distress] : no resp distress [No Abnormalities] : no abnormalities [Benign] : benign [Normal Gait] : normal gait [No Clubbing] : no clubbing [No Cyanosis] : no cyanosis [FROM] : FROM [Normal Color/ Pigmentation] : normal color/ pigmentation [No Focal Deficits] : no focal deficits [Oriented x3] : oriented x3 [Normal Affect] : normal affect [TextBox_2] : OW [TextBox_68] : I:E 1:3; Clear

## 2024-08-16 ENCOUNTER — APPOINTMENT (OUTPATIENT)
Dept: PULMONOLOGY | Facility: CLINIC | Age: 49
End: 2024-08-16
Payer: MEDICARE

## 2024-08-16 VITALS
HEIGHT: 72 IN | BODY MASS INDEX: 39.28 KG/M2 | HEART RATE: 81 BPM | OXYGEN SATURATION: 96 % | TEMPERATURE: 96 F | SYSTOLIC BLOOD PRESSURE: 132 MMHG | WEIGHT: 290 LBS | RESPIRATION RATE: 16 BRPM | DIASTOLIC BLOOD PRESSURE: 80 MMHG

## 2024-08-16 DIAGNOSIS — J44.9 CHRONIC OBSTRUCTIVE PULMONARY DISEASE, UNSPECIFIED: ICD-10-CM

## 2024-08-16 DIAGNOSIS — R06.02 SHORTNESS OF BREATH: ICD-10-CM

## 2024-08-16 DIAGNOSIS — K21.9 GASTRO-ESOPHAGEAL REFLUX DISEASE W/OUT ESOPHAGITIS: ICD-10-CM

## 2024-08-16 DIAGNOSIS — J39.8 OTHER SPECIFIED DISEASES OF UPPER RESPIRATORY TRACT: ICD-10-CM

## 2024-08-16 DIAGNOSIS — G47.33 OBSTRUCTIVE SLEEP APNEA (ADULT) (PEDIATRIC): ICD-10-CM

## 2024-08-16 DIAGNOSIS — F17.200 NICOTINE DEPENDENCE, UNSPECIFIED, UNCOMPLICATED: ICD-10-CM

## 2024-08-16 DIAGNOSIS — R06.83 SNORING: ICD-10-CM

## 2024-08-16 DIAGNOSIS — J30.9 ALLERGIC RHINITIS, UNSPECIFIED: ICD-10-CM

## 2024-08-16 DIAGNOSIS — J45.909 UNSPECIFIED ASTHMA, UNCOMPLICATED: ICD-10-CM

## 2024-08-16 PROCEDURE — 95012 NITRIC OXIDE EXP GAS DETER: CPT

## 2024-08-16 PROCEDURE — 99214 OFFICE O/P EST MOD 30 MIN: CPT | Mod: 25

## 2024-08-16 PROCEDURE — 94010 BREATHING CAPACITY TEST: CPT

## 2024-08-16 NOTE — PHYSICAL EXAM
[No Acute Distress] : no acute distress [Normal Oropharynx] : normal oropharynx [III] : Mallampati Class: III [Normal Appearance] : normal appearance [No Neck Mass] : no neck mass [Normal Rate/Rhythm] : normal rate/rhythm [Normal S1, S2] : normal s1, s2 [No Murmurs] : no murmurs [No Resp Distress] : no resp distress [Clear to Auscultation Bilaterally] : clear to auscultation bilaterally [No Abnormalities] : no abnormalities [Benign] : benign [Normal Gait] : normal gait [No Clubbing] : no clubbing [No Cyanosis] : no cyanosis [FROM] : FROM [Normal Color/ Pigmentation] : normal color/ pigmentation [No Focal Deficits] : no focal deficits [Oriented x3] : oriented x3 [Normal Affect] : normal affect [TextBox_2] : OW [TextBox_68] : I:E 1:3; Clear  [TextBox_89] : abdominal hernia and discomfort [TextBox_105] : trace LE edema

## 2024-08-16 NOTE — PROCEDURE
[FreeTextEntry1] : PFTs revealed normal flows; FEV1 was 3.41L, which is 80% of predicted; normal flow volume loop. PFTs were performed to evaluate for asthma, COPD  FENO was 5; a normal value being less than 25 Fractional exhaled nitric oxide (FENO) is regarded as a simple, noninvasive method for assessing eosinophilic airway inflammation. Produced by a variety of cells within the lung, nitric oxide (NO) concentrations are generally low in healthy individuals. However, high concentrations of NO appear to be involved in nonspecific host defense mechanisms and chronic inflammatory diseases such as asthma. The American Thoracic Society (ATS) therefore has recommended using FENO to aid in the diagnosis and monitoring of eosinophilic airway inflammation and asthma, and for identifying steroid responsive individuals whose chronic respiratory symptoms may be caused by airway inflammation.

## 2024-08-16 NOTE — ASSESSMENT
[FreeTextEntry1] : Mr. Call is a 48 year old male active smoker with a history of obesity, COPD/asthma, heart murmur, IMMANUEL, borderline DM, and HTN coming in for pulmonary re-evaluation for his shortness of breath as he is noncompliant. He is still trying to quit smoking. He is s/p acute bronchitis / asthmatic bronchitis 5/2020. (Non-complaint) - smoking still - still sx chest Dx with tracheal stenosis - smoking still/weight issues - "chronic cough"- ?new onset SLE (rheumatology pending)  His shortness of breath is felt to be multifactorial due to: -overweight -out of shape -poor breathing mechanics -COPD (rarely active) -asthma / tracheal stenosis -? underlying cardiac disease (valvular heart disease)  problem 1: COPD/asthma (Active due to smoking) -continue Singulair 10 mg QHS  -continue Symbicort 160 2 inhalations BID -continue Spiriva 2 inhalations QD   -continue Proventil PRN and before exercise  -continue Xopenex (0.3) TID via nebulizer  -s/p blood work for + asthma profile, + food IgE panel, - eosinophil level, - IgE level, - Vitamin D level  Compliance was highly stressed and discussed with the patient the importance of regular inhaler and medication use.  -COPD is a progressive disease and although it can't be cured , appropriate management can slow its progression, reduce frequency and severity of exacerbations, and improve symptoms and the patient quality of life. Hospitalizations are the greatest contributor to the total COPD costs and account for up to 87% of total COPD related costs. Exacerbations are the main cause of admissions and subsequently account for the 40-75% of COPD costs. Inhaled maintenance therapy reduces the incidence of exacerbations in patients with stable COPD. Incorrect inhaler use and nonadherence are major obstacles to achieving COPD treatment goals. Many COPD patients have challenges (impaired inhalation, limited dexterity, reduced cognition: that limit their ability to correctly use their COPD treatment devices resulting in reduced symptom control. Of most importance is smoking cessation and early intervention with respiratory illnesses and contemplation for pulmonary rehab to enhance quality of life. -Asthma is  believed to be caused by inherited (genetic) and environmental factor, but its exact cause is unknown. Asthma may be triggered by allergens, lung infections, or irritants in the air. Asthma triggers are different for each person -Inhaler technique reviewed as well as oral hygiene techniques reviewed with patient. Avoidance of cold air, extremes of temperature, rescue inhaler should be used before exercise. Order of medication reviewed with patient. Recommended use of a cool mist humidifier in the bedroom.   Problem 1A: tracheal stenosis -Recommended evaluation with ENT with Dr. Martin  -Recommended CTS evaluation with Dr. Yfn Perrin  problem 2: ? cardiac component -recommended cardiac evaluation secondary to history of hx of murmur, diabetes, HTN, smoking, and obesity   problem 3: allergic rhinitis/sinusitis (persistent Sx) -complete sinus CT then f/u with ENT - continue Clarinex 5 mg QAM -recommended the Sinugator for nasal rinsing -followed by Flonase 1 sniff/nostril BID - transition to Xhance 1 sniff BID -followed by Astelin 0.15 1 sniff/nostril BID   problem 4: current everyday smoker / nicotine addiction - (discussed: 08/16/2024) -recommended to taper down cigarettes  -recommended to try Nicotrol patches -Discussed for five minutes with the patient the risks/associations with continued smoking including COPD, emphysema, shortness of breath, renal cancer, bladder cancer, stroke risk, cardiac disease, etc. Smoking cessation was discussed at length and highly encouraged. Various options to aid cessation was discussed including use of Chantix, Nicotrol, nicotine products, laser therapy, hypnosis, Wellbutrin, etc.   problem 5: IMMANUEL (compliant) -continue to use the CPAP machine, benefiting, and tolerating it well.  -recommended to use humidifier and Mouth Kote -Sleep apnea is associated with adverse clinical consequences which an affect most organ systems.  Cardiovascular disease risk includes arrhythmias, atrial fibrillation, hypertension, coronary artery disease, and stroke. Metabolic disorders include diabetes type 2, non-alcoholic fatty liver disease. Mood disorder especially depression; and cognitive decline especially in the elderly. Associations with  chronic reflux/Luna's esophagus some but not all inclusive.  -Reasons to assess this include arousal consistent with hypopnea; respiratory events most prominent in REM sleep or supine position; therefore sleep staging and body position are important for accurate diagnosis and estimation of AHI. -According to the National [employer] [place], CPAP or continuous positive airway pressure is a treatment that uses mild air pressure to keep breathing airways open. A CPAP machine includes a mask or other device that fits over the nose or nose and mouth. The mask is connected to a machine via the tube through which humidified air is blown. In the cases of obstructive sleep apnea, CPAP can reverse the complete blockages or narrowing of upper airways. Following diagnosis, CPAP machine pressures can be determined by a CPAP titration. Individuals who require CPAP can choose among masks and equipment that meet prescription and maximize comfort. Many become accustomed right away while others could require more time. Problems include uncomfortable masks or air leakage which can be adjusted to optimize compliance.   problem 6: GERD-quiet  -recommended Dr. Hobbs' for constipation-resolved  -continue Omeprazole 40 mg before breakfast  -Rule of 2s: avoid eating too much, eating too late, eating too spicy, eating two hours before bed -Things to avoid including overeating, spicy foods, tight clothing, eating within three hours of bed, this list is not all inclusive.  -For treatment of reflux, possible options discussed including diet control, H2 blockers, PPIs, as well as coating motility agents discussed as treatment options. Timing of meals and proximity of last meal to sleep were discussed. If symptoms persist, a formal gastrointestinal evaluation is needed.  problem 7: obesity (290+) -recommended "10-Day Detox Diet" by Dr. Richard Ch  -recommended regular exercise -mindful v mindless eating  -Weight loss, exercise, and diet control were discussed and are highly encouraged. Treatment options were given such as, aqua therapy, and contacting a nutritionist. Recommended to use the elliptical, stationary bike, less use of treadmill.  Obesity is associated with worsening asthma, shortness of breath, and potential for cardiac disease, diabetes, and other underlying medical conditions.  problem 8: poor breathing mechanics -Recommended [place] Hof and B[place]ko breathing techniques  -Proper breathing techniques were reviewed with an emphasis of exhalation. Patient instructed to breath in for 1 second and out for four seconds. Patient was encouraged to not talk while walking.   problem 9: r/o allergen profile- rediscussed  -s/p blood work to include: - IgE level, - eosinophil level, - vitamin D level, + food IgE level, and + allergy panel(prescription re-given)  problem 10: r/o chronic fatigue -aside from OSAS other etiologies include thyroid disease, anemia, low testosterone levels, and vitamin deficiencies as well as medication effects. Based on this recommended: CBC, thyroid function test, vitamin D levels, sleep study, and free and total testosterone levels. Dr. Walton went over topic multiple times and discussed for an extensive period of time.   Problem 11: Health Maintenance/COVID19 Precautions: Vaccine hesitant (still) -COVID-19 vaccine discussed with patient Immune Support Recommendations: -OTC Vitamin C 500mg BID  -OTC Quercetin 250-500mg BID  -OTC Zinc 75-100mg per day  -OTC Melatonin 1or 2mg a night  -OTC Vitamin D 1-4000mg per day -Tonic Water 8oz -Recommended to Stay Hydrated (At least a gallon/day)  Asthma and COVID19: You need to make sure your asthma is under control. This often requires the use of inhaled corticosteroids (and sometimes oral corticosteroids). Inhaled corticosteroids do not likely reduce your immune system's ability to fight infections, but oral corticosteroids may. It is important to use the steps above to protect yourself to limit your exposure to any respiratory virus.   problem 12: health maintenance  -recommended to use Dr. Garcia's Intestinal Formula #1  -refused flu shot 2023 -recommended strep pneumonia vaccines: Prevnar-13 vaccine, followed by Pneumo vaccine 23 one year following -recommended early intervention for URIs -recommended regular osteoporosis evaluations -recommended early dermatological evaluations -recommended after the age of 50 to the age of 70, colonoscopy every 5 years   Follow up in 4 months with spirometry and NiOx He is encouraged to call with any changes, concerns, or questions.

## 2024-08-16 NOTE — ADDENDUM
[FreeTextEntry1] : Documented by Meghann Stephenson acting as a scribe for Dr. Trey Walton on 08/16/2024. All medical record entries made by the Scribe were at my, Dr. Trey Walton's, direction and personally dictated by me on 08/16/2024. I have reviewed the chart and agree that the record accurately reflects my personal performance of the history, physical exam, assessment and plan. I have also personally directed, reviewed, and agree with the discharge instructions.

## 2024-08-16 NOTE — HISTORY OF PRESENT ILLNESS
[FreeTextEntry1] : Mr. Call is a 48 year old male presenting to the office for a follow-up pulmonary evaluation for asthma, allergic rhinitis, COPD, GERD, nicotine addiction, IMMANUEL, snoring, and shortness of breath. His chief complaint is  -he notes his bowels have improved drinking a protein smoothie every morning. he's eating more fruits -he notes his energy levels are 6-7/10 -he notes pain and stiffness in both his hips, and stiffness in his calf muscles. when he sleeps, he has paresthesia in his arms and thighs. He was recently diagnosed with the beginning of Lupus as per Dr. Sen, which his mother had. Dr. Sen told him to improve his diet and go gluten-free -he notes sleeping from 11PM-5AM (6 hours) -he denies dysphonia  -he notes he's still smoking cigarettes (2-3 packs per week) because he's depressed and bored -he denies exercising beyond walking -he denies having access to a pool -he notes he's been using his inhalers -he notes he's using his CPAP and tolerating it well -he notes weight is stable (290 lbs) -he notes his HgbA1C is stable at 5.4 on Metformin  -he denies any headaches, nausea, emesis, fever, chills, sweats, chest pain, chest pressure, coughing, wheezing, palpitations, diarrhea, constipation, dysphagia, vertigo, leg swelling, itchy eyes, itchy ears, heartburn, reflux, or sour taste in the mouth.

## 2025-01-02 ENCOUNTER — APPOINTMENT (OUTPATIENT)
Dept: PULMONOLOGY | Facility: CLINIC | Age: 50
End: 2025-01-02
Payer: MEDICARE

## 2025-01-02 VITALS
HEIGHT: 72 IN | BODY MASS INDEX: 39.68 KG/M2 | TEMPERATURE: 97.4 F | DIASTOLIC BLOOD PRESSURE: 80 MMHG | SYSTOLIC BLOOD PRESSURE: 130 MMHG | WEIGHT: 293 LBS | RESPIRATION RATE: 16 BRPM

## 2025-01-02 VITALS — OXYGEN SATURATION: 96 % | HEART RATE: 105 BPM

## 2025-01-02 DIAGNOSIS — G47.33 OBSTRUCTIVE SLEEP APNEA (ADULT) (PEDIATRIC): ICD-10-CM

## 2025-01-02 DIAGNOSIS — J30.9 ALLERGIC RHINITIS, UNSPECIFIED: ICD-10-CM

## 2025-01-02 DIAGNOSIS — K21.9 GASTRO-ESOPHAGEAL REFLUX DISEASE W/OUT ESOPHAGITIS: ICD-10-CM

## 2025-01-02 DIAGNOSIS — R06.02 SHORTNESS OF BREATH: ICD-10-CM

## 2025-01-02 DIAGNOSIS — F17.200 NICOTINE DEPENDENCE, UNSPECIFIED, UNCOMPLICATED: ICD-10-CM

## 2025-01-02 DIAGNOSIS — J39.8 OTHER SPECIFIED DISEASES OF UPPER RESPIRATORY TRACT: ICD-10-CM

## 2025-01-02 DIAGNOSIS — M54.42 LUMBAGO WITH SCIATICA, LEFT SIDE: ICD-10-CM

## 2025-01-02 DIAGNOSIS — J45.909 UNSPECIFIED ASTHMA, UNCOMPLICATED: ICD-10-CM

## 2025-01-02 PROCEDURE — 99214 OFFICE O/P EST MOD 30 MIN: CPT | Mod: 25

## 2025-01-02 PROCEDURE — 94010 BREATHING CAPACITY TEST: CPT

## 2025-01-02 PROCEDURE — 99406 BEHAV CHNG SMOKING 3-10 MIN: CPT | Mod: 25

## 2025-01-02 PROCEDURE — 94727 GAS DIL/WSHOT DETER LNG VOL: CPT

## 2025-01-02 PROCEDURE — 95012 NITRIC OXIDE EXP GAS DETER: CPT

## 2025-01-02 PROCEDURE — 94729 DIFFUSING CAPACITY: CPT

## 2025-06-02 ENCOUNTER — APPOINTMENT (OUTPATIENT)
Dept: PULMONOLOGY | Facility: CLINIC | Age: 50
End: 2025-06-02
Payer: MEDICARE

## 2025-06-02 VITALS — DIASTOLIC BLOOD PRESSURE: 78 MMHG | SYSTOLIC BLOOD PRESSURE: 138 MMHG

## 2025-06-02 DIAGNOSIS — J45.909 UNSPECIFIED ASTHMA, UNCOMPLICATED: ICD-10-CM

## 2025-06-02 DIAGNOSIS — R06.83 SNORING: ICD-10-CM

## 2025-06-02 DIAGNOSIS — J30.9 ALLERGIC RHINITIS, UNSPECIFIED: ICD-10-CM

## 2025-06-02 DIAGNOSIS — G47.33 OBSTRUCTIVE SLEEP APNEA (ADULT) (PEDIATRIC): ICD-10-CM

## 2025-06-02 DIAGNOSIS — F17.200 NICOTINE DEPENDENCE, UNSPECIFIED, UNCOMPLICATED: ICD-10-CM

## 2025-06-02 DIAGNOSIS — J44.9 CHRONIC OBSTRUCTIVE PULMONARY DISEASE, UNSPECIFIED: ICD-10-CM

## 2025-06-02 DIAGNOSIS — E66.3 OVERWEIGHT: ICD-10-CM

## 2025-06-02 DIAGNOSIS — J39.8 OTHER SPECIFIED DISEASES OF UPPER RESPIRATORY TRACT: ICD-10-CM

## 2025-06-02 DIAGNOSIS — K21.9 GASTRO-ESOPHAGEAL REFLUX DISEASE W/OUT ESOPHAGITIS: ICD-10-CM

## 2025-06-02 DIAGNOSIS — R06.02 SHORTNESS OF BREATH: ICD-10-CM

## 2025-06-02 PROCEDURE — 99214 OFFICE O/P EST MOD 30 MIN: CPT | Mod: 25

## 2025-06-02 PROCEDURE — 94727 GAS DIL/WSHOT DETER LNG VOL: CPT

## 2025-06-02 PROCEDURE — 94010 BREATHING CAPACITY TEST: CPT

## 2025-06-02 PROCEDURE — 94729 DIFFUSING CAPACITY: CPT

## 2025-06-02 PROCEDURE — 95012 NITRIC OXIDE EXP GAS DETER: CPT

## 2025-06-02 PROCEDURE — ZZZZZ: CPT

## 2025-06-02 PROCEDURE — 99406 BEHAV CHNG SMOKING 3-10 MIN: CPT | Mod: 25

## (undated) DEVICE — DRSG CURITY GAUZE SPONGE 4 X 4" 12-PLY

## (undated) DEVICE — BIOPSY FORCEP OLYMPUS ALLIGATOR 2.0MM

## (undated) DEVICE — DRSG TELFA 3 X 8

## (undated) DEVICE — TUBING SUCTION NONCONDUCTIVE 6MM X 12FT

## (undated) DEVICE — DRAPE 3/4 SHEET 52X76"

## (undated) DEVICE — CHEST DRAIN OASIS DRY SUCTION WATER SEAL

## (undated) DEVICE — ADAPTER FIBEROPTIC BRONCHOSCOPE DUAL AXIS SWIVEL

## (undated) DEVICE — VENODYNE/SCD SLEEVE CALF MEDIUM

## (undated) DEVICE — WARMING BLANKET LOWER ADULT

## (undated) DEVICE — SYR SLIP 10CC

## (undated) DEVICE — POSITIONER STRAP ARMBOARD VELCRO TS-30

## (undated) DEVICE — VALVE SUCTION EVIS 160/200/240

## (undated) DEVICE — TRAP SPECIMEN SPUTUM 40CC

## (undated) DEVICE — VALVE BIOPSY BRONCHOVIDEOSCOPE

## (undated) DEVICE — ELCTR GROUNDING PAD ADULT COVIDIEN

## (undated) DEVICE — SOL INJ LR 1000ML

## (undated) DEVICE — DRAPE LARGE SHEET 72X85"